# Patient Record
Sex: FEMALE | Race: WHITE | Employment: FULL TIME | URBAN - METROPOLITAN AREA
[De-identification: names, ages, dates, MRNs, and addresses within clinical notes are randomized per-mention and may not be internally consistent; named-entity substitution may affect disease eponyms.]

---

## 2019-06-13 ENCOUNTER — APPOINTMENT (OUTPATIENT)
Dept: GENERAL RADIOLOGY | Age: 39
DRG: 639 | End: 2019-06-13
Attending: EMERGENCY MEDICINE
Payer: SELF-PAY

## 2019-06-13 ENCOUNTER — HOSPITAL ENCOUNTER (INPATIENT)
Age: 39
LOS: 4 days | Discharge: HOME | DRG: 639 | End: 2019-06-17
Attending: EMERGENCY MEDICINE | Admitting: HOSPITALIST
Payer: SELF-PAY

## 2019-06-13 DIAGNOSIS — E13.10 DIABETIC KETOACIDOSIS WITHOUT COMA ASSOCIATED WITH OTHER SPECIFIED DIABETES MELLITUS (HCC): Primary | ICD-10-CM

## 2019-06-13 PROBLEM — E11.10 DKA (DIABETIC KETOACIDOSES): Status: ACTIVE | Noted: 2019-06-13

## 2019-06-13 LAB
ADMINISTERED INITIALS, ADMINIT: NORMAL
ALBUMIN SERPL-MCNC: 3.9 G/DL (ref 3.5–5)
ALBUMIN/GLOB SERPL: 1.2 {RATIO} (ref 1.1–2.2)
ALP SERPL-CCNC: 62 U/L (ref 45–117)
ALT SERPL-CCNC: 22 U/L (ref 12–78)
ANION GAP SERPL CALC-SCNC: 17 MMOL/L (ref 5–15)
ANION GAP SERPL CALC-SCNC: 7 MMOL/L (ref 5–15)
APPEARANCE UR: CLEAR
ARTERIAL PATENCY WRIST A: ABNORMAL
AST SERPL-CCNC: 16 U/L (ref 15–37)
B-OH-BUTYR SERPL-SCNC: 4.27 MMOL/L
BACTERIA URNS QL MICRO: ABNORMAL /HPF
BASE DEFICIT BLDV-SCNC: 5 MMOL/L
BASOPHILS # BLD: 0.1 K/UL (ref 0–0.1)
BASOPHILS NFR BLD: 1 % (ref 0–1)
BDY SITE: ABNORMAL
BILIRUB SERPL-MCNC: 0.6 MG/DL (ref 0.2–1)
BILIRUB UR QL: NEGATIVE
BUN SERPL-MCNC: 10 MG/DL (ref 6–20)
BUN SERPL-MCNC: 13 MG/DL (ref 6–20)
BUN/CREAT SERPL: 19 (ref 12–20)
BUN/CREAT SERPL: 20 (ref 12–20)
CALCIUM SERPL-MCNC: 8.3 MG/DL (ref 8.5–10.1)
CALCIUM SERPL-MCNC: 9.8 MG/DL (ref 8.5–10.1)
CHLORIDE SERPL-SCNC: 109 MMOL/L (ref 97–108)
CHLORIDE SERPL-SCNC: 97 MMOL/L (ref 97–108)
CO2 SERPL-SCNC: 20 MMOL/L (ref 21–32)
CO2 SERPL-SCNC: 23 MMOL/L (ref 21–32)
COLOR UR: ABNORMAL
COMMENT, HOLDF: NORMAL
CREAT SERPL-MCNC: 0.51 MG/DL (ref 0.55–1.02)
CREAT SERPL-MCNC: 0.7 MG/DL (ref 0.55–1.02)
D50 ADMINISTERED, D50ADM: 0 ML
D50 ORDER, D50ORD: 0 ML
DIFFERENTIAL METHOD BLD: ABNORMAL
EOSINOPHIL # BLD: 0.1 K/UL (ref 0–0.4)
EOSINOPHIL NFR BLD: 2 % (ref 0–7)
EPITH CASTS URNS QL MICRO: ABNORMAL /LPF
ERYTHROCYTE [DISTWIDTH] IN BLOOD BY AUTOMATED COUNT: 12.4 % (ref 11.5–14.5)
EST. AVERAGE GLUCOSE BLD GHB EST-MCNC: 355 MG/DL
GAS FLOW.O2 O2 DELIVERY SYS: ABNORMAL L/MIN
GLOBULIN SER CALC-MCNC: 3.3 G/DL (ref 2–4)
GLSCOM COMMENTS: NORMAL
GLUCOSE BLD STRIP.AUTO-MCNC: 125 MG/DL (ref 65–100)
GLUCOSE BLD STRIP.AUTO-MCNC: 140 MG/DL (ref 65–100)
GLUCOSE BLD STRIP.AUTO-MCNC: 173 MG/DL (ref 65–100)
GLUCOSE BLD STRIP.AUTO-MCNC: 179 MG/DL (ref 65–100)
GLUCOSE BLD STRIP.AUTO-MCNC: 186 MG/DL (ref 65–100)
GLUCOSE BLD STRIP.AUTO-MCNC: 194 MG/DL (ref 65–100)
GLUCOSE BLD STRIP.AUTO-MCNC: 230 MG/DL (ref 65–100)
GLUCOSE BLD STRIP.AUTO-MCNC: 275 MG/DL (ref 65–100)
GLUCOSE BLD STRIP.AUTO-MCNC: 316 MG/DL (ref 65–100)
GLUCOSE SERPL-MCNC: 166 MG/DL (ref 65–100)
GLUCOSE SERPL-MCNC: 346 MG/DL (ref 65–100)
GLUCOSE UR STRIP.AUTO-MCNC: >1000 MG/DL
GLUCOSE, GLC: 125 MG/DL
GLUCOSE, GLC: 140 MG/DL
GLUCOSE, GLC: 173 MG/DL
GLUCOSE, GLC: 179 MG/DL
GLUCOSE, GLC: 186 MG/DL
GLUCOSE, GLC: 230 MG/DL
GLUCOSE, GLC: 275 MG/DL
HBA1C MFR BLD: 14 % (ref 4.2–6.3)
HCO3 BLDV-SCNC: 21.2 MMOL/L (ref 23–28)
HCT VFR BLD AUTO: 42.1 % (ref 35–47)
HGB BLD-MCNC: 14.9 G/DL (ref 11.5–16)
HGB UR QL STRIP: ABNORMAL
HIGH TARGET, HITG: 250 MG/DL
IMM GRANULOCYTES # BLD AUTO: 0.1 K/UL (ref 0–0.04)
IMM GRANULOCYTES NFR BLD AUTO: 2 % (ref 0–0.5)
INSULIN ADMINSTERED, INSADM: 0 UNITS/HOUR
INSULIN ADMINSTERED, INSADM: 0.7 UNITS/HOUR
INSULIN ADMINSTERED, INSADM: 2.3 UNITS/HOUR
INSULIN ADMINSTERED, INSADM: 2.4 UNITS/HOUR
INSULIN ADMINSTERED, INSADM: 2.5 UNITS/HOUR
INSULIN ADMINSTERED, INSADM: 3.4 UNITS/HOUR
INSULIN ADMINSTERED, INSADM: 4.3 UNITS/HOUR
INSULIN ORDER, INSORD: 0 UNITS/HOUR
INSULIN ORDER, INSORD: 0.7 UNITS/HOUR
INSULIN ORDER, INSORD: 2.3 UNITS/HOUR
INSULIN ORDER, INSORD: 2.4 UNITS/HOUR
INSULIN ORDER, INSORD: 2.5 UNITS/HOUR
INSULIN ORDER, INSORD: 3.4 UNITS/HOUR
INSULIN ORDER, INSORD: 4.3 UNITS/HOUR
KETONES UR QL STRIP.AUTO: >80 MG/DL
LEUKOCYTE ESTERASE UR QL STRIP.AUTO: NEGATIVE
LIPASE SERPL-CCNC: 263 U/L (ref 73–393)
LOW TARGET, LOT: 150 MG/DL
LYMPHOCYTES # BLD: 1.6 K/UL (ref 0.8–3.5)
LYMPHOCYTES NFR BLD: 29 % (ref 12–49)
MAGNESIUM SERPL-MCNC: 1.4 MG/DL (ref 1.6–2.4)
MAGNESIUM SERPL-MCNC: 1.7 MG/DL (ref 1.6–2.4)
MCH RBC QN AUTO: 29.6 PG (ref 26–34)
MCHC RBC AUTO-ENTMCNC: 35.4 G/DL (ref 30–36.5)
MCV RBC AUTO: 83.5 FL (ref 80–99)
MINUTES UNTIL NEXT BG, NBG: 60 MIN
MONOCYTES # BLD: 0.4 K/UL (ref 0–1)
MONOCYTES NFR BLD: 8 % (ref 5–13)
MUCOUS THREADS URNS QL MICRO: ABNORMAL /LPF
MULTIPLIER, MUL: 0
MULTIPLIER, MUL: 0.01
MULTIPLIER, MUL: 0.02
NEUTS SEG # BLD: 3.2 K/UL (ref 1.8–8)
NEUTS SEG NFR BLD: 58 % (ref 32–75)
NITRITE UR QL STRIP.AUTO: NEGATIVE
NRBC # BLD: 0 K/UL (ref 0–0.01)
NRBC BLD-RTO: 0 PER 100 WBC
O2/TOTAL GAS SETTING VFR VENT: 21 %
ORDER INITIALS, ORDINIT: NORMAL
PCO2 BLDV: 41.3 MMHG (ref 41–51)
PH BLDV: 7.32 [PH] (ref 7.32–7.42)
PH UR STRIP: 5.5 [PH] (ref 5–8)
PHOSPHATE SERPL-MCNC: 3.8 MG/DL (ref 2.6–4.7)
PLATELET # BLD AUTO: 270 K/UL (ref 150–400)
PMV BLD AUTO: 10.8 FL (ref 8.9–12.9)
PO2 BLDV: 23 MMHG (ref 25–40)
POTASSIUM SERPL-SCNC: 3.4 MMOL/L (ref 3.5–5.1)
POTASSIUM SERPL-SCNC: 4 MMOL/L (ref 3.5–5.1)
PROT SERPL-MCNC: 7.2 G/DL (ref 6.4–8.2)
PROT UR STRIP-MCNC: NEGATIVE MG/DL
RBC # BLD AUTO: 5.04 M/UL (ref 3.8–5.2)
RBC #/AREA URNS HPF: ABNORMAL /HPF (ref 0–5)
SAMPLES BEING HELD,HOLD: NORMAL
SAO2 % BLDV: 35 % (ref 65–88)
SERVICE CMNT-IMP: ABNORMAL
SODIUM SERPL-SCNC: 134 MMOL/L (ref 136–145)
SODIUM SERPL-SCNC: 139 MMOL/L (ref 136–145)
SP GR UR REFRACTOMETRY: 1.02 (ref 1–1.03)
SPECIMEN TYPE: ABNORMAL
UROBILINOGEN UR QL STRIP.AUTO: 0.2 EU/DL (ref 0.2–1)
WBC # BLD AUTO: 5.4 K/UL (ref 3.6–11)
WBC URNS QL MICRO: ABNORMAL /HPF (ref 0–4)

## 2019-06-13 PROCEDURE — 84100 ASSAY OF PHOSPHORUS: CPT

## 2019-06-13 PROCEDURE — 81001 URINALYSIS AUTO W/SCOPE: CPT

## 2019-06-13 PROCEDURE — 74011250637 HC RX REV CODE- 250/637: Performed by: NURSE PRACTITIONER

## 2019-06-13 PROCEDURE — 96374 THER/PROPH/DIAG INJ IV PUSH: CPT

## 2019-06-13 PROCEDURE — 80053 COMPREHEN METABOLIC PANEL: CPT

## 2019-06-13 PROCEDURE — 82010 KETONE BODYS QUAN: CPT

## 2019-06-13 PROCEDURE — 36415 COLL VENOUS BLD VENIPUNCTURE: CPT

## 2019-06-13 PROCEDURE — 74011636637 HC RX REV CODE- 636/637: Performed by: EMERGENCY MEDICINE

## 2019-06-13 PROCEDURE — 96361 HYDRATE IV INFUSION ADD-ON: CPT

## 2019-06-13 PROCEDURE — 80061 LIPID PANEL: CPT

## 2019-06-13 PROCEDURE — 65660000001 HC RM ICU INTERMED STEPDOWN

## 2019-06-13 PROCEDURE — 85025 COMPLETE CBC W/AUTO DIFF WBC: CPT

## 2019-06-13 PROCEDURE — 74011000258 HC RX REV CODE- 258: Performed by: EMERGENCY MEDICINE

## 2019-06-13 PROCEDURE — 83690 ASSAY OF LIPASE: CPT

## 2019-06-13 PROCEDURE — 83036 HEMOGLOBIN GLYCOSYLATED A1C: CPT

## 2019-06-13 PROCEDURE — 74022 RADEX COMPL AQT ABD SERIES: CPT

## 2019-06-13 PROCEDURE — 74011250636 HC RX REV CODE- 250/636: Performed by: EMERGENCY MEDICINE

## 2019-06-13 PROCEDURE — 82803 BLOOD GASES ANY COMBINATION: CPT

## 2019-06-13 PROCEDURE — 83735 ASSAY OF MAGNESIUM: CPT

## 2019-06-13 PROCEDURE — 74011636637 HC RX REV CODE- 636/637: Performed by: HOSPITALIST

## 2019-06-13 PROCEDURE — 82962 GLUCOSE BLOOD TEST: CPT

## 2019-06-13 PROCEDURE — 74011250636 HC RX REV CODE- 250/636: Performed by: HOSPITALIST

## 2019-06-13 PROCEDURE — 99285 EMERGENCY DEPT VISIT HI MDM: CPT

## 2019-06-13 RX ORDER — DEXTROSE 50 % IN WATER (D50W) INTRAVENOUS SYRINGE
25-50 AS NEEDED
Status: DISCONTINUED | OUTPATIENT
Start: 2019-06-13 | End: 2019-06-13

## 2019-06-13 RX ORDER — SODIUM CHLORIDE 0.9 % (FLUSH) 0.9 %
5-40 SYRINGE (ML) INJECTION EVERY 8 HOURS
Status: DISCONTINUED | OUTPATIENT
Start: 2019-06-13 | End: 2019-06-17 | Stop reason: HOSPADM

## 2019-06-13 RX ORDER — ACETAMINOPHEN 325 MG/1
650 TABLET ORAL
Status: DISCONTINUED | OUTPATIENT
Start: 2019-06-13 | End: 2019-06-17 | Stop reason: HOSPADM

## 2019-06-13 RX ORDER — MAGNESIUM SULFATE 100 %
4 CRYSTALS MISCELLANEOUS AS NEEDED
Status: DISCONTINUED | OUTPATIENT
Start: 2019-06-13 | End: 2019-06-13

## 2019-06-13 RX ORDER — DEXTROSE MONOHYDRATE AND SODIUM CHLORIDE 5; .9 G/100ML; G/100ML
125 INJECTION, SOLUTION INTRAVENOUS CONTINUOUS
Status: DISCONTINUED | OUTPATIENT
Start: 2019-06-13 | End: 2019-06-13

## 2019-06-13 RX ORDER — MAGNESIUM SULFATE HEPTAHYDRATE 40 MG/ML
2 INJECTION, SOLUTION INTRAVENOUS ONCE
Status: COMPLETED | OUTPATIENT
Start: 2019-06-13 | End: 2019-06-13

## 2019-06-13 RX ORDER — SODIUM CHLORIDE 9 MG/ML
200 INJECTION, SOLUTION INTRAVENOUS CONTINUOUS
Status: DISCONTINUED | OUTPATIENT
Start: 2019-06-13 | End: 2019-06-13

## 2019-06-13 RX ORDER — SODIUM CHLORIDE 0.9 % (FLUSH) 0.9 %
5-40 SYRINGE (ML) INJECTION AS NEEDED
Status: DISCONTINUED | OUTPATIENT
Start: 2019-06-13 | End: 2019-06-17 | Stop reason: HOSPADM

## 2019-06-13 RX ORDER — INSULIN LISPRO 100 [IU]/ML
INJECTION, SOLUTION INTRAVENOUS; SUBCUTANEOUS
Status: DISCONTINUED | OUTPATIENT
Start: 2019-06-14 | End: 2019-06-17 | Stop reason: HOSPADM

## 2019-06-13 RX ORDER — ONDANSETRON 2 MG/ML
4 INJECTION INTRAMUSCULAR; INTRAVENOUS
Status: COMPLETED | OUTPATIENT
Start: 2019-06-13 | End: 2019-06-13

## 2019-06-13 RX ORDER — DEXTROSE 50 % IN WATER (D50W) INTRAVENOUS SYRINGE
12.5-25 AS NEEDED
Status: DISCONTINUED | OUTPATIENT
Start: 2019-06-13 | End: 2019-06-17 | Stop reason: HOSPADM

## 2019-06-13 RX ORDER — INSULIN LISPRO 100 [IU]/ML
INJECTION, SOLUTION INTRAVENOUS; SUBCUTANEOUS
Status: DISCONTINUED | OUTPATIENT
Start: 2019-06-13 | End: 2019-06-13 | Stop reason: SDUPTHER

## 2019-06-13 RX ORDER — METFORMIN HYDROCHLORIDE 1000 MG/1
1000 TABLET ORAL 2 TIMES DAILY WITH MEALS
COMMUNITY
End: 2019-12-26

## 2019-06-13 RX ORDER — MAGNESIUM SULFATE 100 %
4 CRYSTALS MISCELLANEOUS AS NEEDED
Status: DISCONTINUED | OUTPATIENT
Start: 2019-06-13 | End: 2019-06-17 | Stop reason: HOSPADM

## 2019-06-13 RX ORDER — DEXTROSE, SODIUM CHLORIDE, AND POTASSIUM CHLORIDE 5; .9; .15 G/100ML; G/100ML; G/100ML
125 INJECTION INTRAVENOUS CONTINUOUS
Status: DISCONTINUED | OUTPATIENT
Start: 2019-06-13 | End: 2019-06-13

## 2019-06-13 RX ADMIN — SODIUM CHLORIDE 200 ML/HR: 900 INJECTION, SOLUTION INTRAVENOUS at 15:19

## 2019-06-13 RX ADMIN — DEXTROSE MONOHYDRATE AND SODIUM CHLORIDE 125 ML/HR: 5; .9 INJECTION, SOLUTION INTRAVENOUS at 17:12

## 2019-06-13 RX ADMIN — SODIUM CHLORIDE 4.3 UNITS/HR: 9 INJECTION, SOLUTION INTRAVENOUS at 15:27

## 2019-06-13 RX ADMIN — Medication 10 ML: at 21:38

## 2019-06-13 RX ADMIN — HUMAN INSULIN 4 UNITS: 100 INJECTION, SUSPENSION SUBCUTANEOUS at 23:12

## 2019-06-13 RX ADMIN — MAGNESIUM SULFATE HEPTAHYDRATE 2 G: 40 INJECTION, SOLUTION INTRAVENOUS at 15:21

## 2019-06-13 RX ADMIN — ACETAMINOPHEN 650 MG: 325 TABLET ORAL at 20:53

## 2019-06-13 RX ADMIN — ONDANSETRON 4 MG: 2 INJECTION INTRAMUSCULAR; INTRAVENOUS at 13:56

## 2019-06-13 RX ADMIN — SODIUM CHLORIDE 1000 ML: 900 INJECTION, SOLUTION INTRAVENOUS at 13:56

## 2019-06-13 RX ADMIN — POTASSIUM CHLORIDE, DEXTROSE MONOHYDRATE AND SODIUM CHLORIDE 125 ML/HR: 150; 5; 900 INJECTION, SOLUTION INTRAVENOUS at 21:38

## 2019-06-13 NOTE — ROUTINE PROCESS
Bedside and Verbal shift change report given to Shaneka Esteban RN (oncoming nurse) by Guillaume Mancuso RN (offgoing nurse). Report included the following information SBAR, Kardex, Intake/Output, MAR, Recent Results and Cardiac Rhythm NSR.

## 2019-06-13 NOTE — H&P
History & Physical    Primary Care Provider: Mara Fregoso NP  Source of Information: Patient     History of Presenting Illness:   Mariya Acosta is a 41 y.o. female who presents with nausea and weakness    Pt is a 41 y.o. F with new dx of Diabetes  here with nausea, weakness and hyperglycemia. She was diagnosed 2 days ago and started on metformin 1000 mg BID. She has been compliant. Her glucose at PCP was 280 and she has a hgb A1c of 15. Patient says she was sent to ED by PCP to see Haley Mcknight she continues to be hyperglycemic despite metformin. Domo Reasoner \"  She denies complaints currently but has had intermittent N/V/D and abdominal pain for months. She has also had a 75 pound weight loss last year. She is a  but says this weight loss was unintentional.  No fever, cough,   She does have back pain. No other complaints at this time. No family history of DM      Review of Systems:  General: HPI, no fever, no changes of sleep or appetite   HEENT: no headache, no vision changes, no nose discharge, no hearing changes   RES: no wheezing, no cough, no sob  CVS: no cp, no palpitation. Muscular: no joint swelling, no muscle pain, no leg swelling  Skin: no rash, no itching   GI:HPI, no diarrhea   : no dysuria, no hematuria  Hemo: no gum bleeding, no petechial   Neuro: no sensation changes, no focal weakness   Endo:+ polydipsia, polyuria, HPI    Psych: denied depression     Past Medical History:   Diagnosis Date    Diabetes ([facility] [address]       Past Surgical History:   Procedure Laterality Date    HX ACL RECONSTRUCTION       Prior to Admission medications    Medication Sig Start Date End Date Taking? Authorizing Provider   metFORMIN (GLUCOPHAGE) 1,000 mg tablet Take 1,000 mg by mouth two (2) times daily (with meals). Yes Other, MD Jessica     No Known Allergies   History reviewed. No pertinent family history.      SOCIAL HISTORY:  Patient resides:  Independently x   Assisted Living    SNF With family care       Smoking history:   None x   Former    Chronic      Alcohol history:   None x   Social    Chronic      Ambulates:   Independently x   w/cane    w/walker    w/wc    CODE STATUS:  DNR    Full x   Other      Objective:     Physical Exam:     Visit Vitals  /78 (BP 1 Location: Left arm, BP Patient Position: At rest)   Pulse 67   Temp 98.1 °F (36.7 °C)   Resp 12   Ht 5' 4\" (1.626 m)   Wt 52.5 kg (115 lb 11.9 oz)   SpO2 97%   Breastfeeding? No   BMI 19.87 kg/m²      O2 Device: Room air    General:  Alert, cooperative, no distress, appears stated age. Head:  Normocephalic, without obvious abnormality, atraumatic. Eyes:  Conjunctivae/corneas clear. PERRL, EOMs intact. Nose: Nares normal. Septum midline. Mucosa normal. No drainage or sinus tenderness. Throat: Lips, mucosa, and tongue normal. Teeth and gums normal.   Neck: Supple, symmetrical, trachea midline, no adenopathy, thyroid: no enlargement/tenderness/nodules, no carotid bruit and no JVD. Back:   Symmetric, no curvature. ROM normal. No CVA tenderness. Lungs:   Clear to auscultation bilaterally. Chest wall:  No tenderness or deformity. Heart:  Regular rate and rhythm, S1, S2 normal, no murmur, click, rub or gallop. Abdomen:   Soft, non-tender. Bowel sounds normal. No masses,  No organomegaly. Extremities: Extremities normal, atraumatic, no cyanosis or edema. Pulses: 2+ and symmetric all extremities. Skin: Skin color, texture, turgor normal. No rashes or lesions   Neurologic: CNII-XII intact.  No focal weakness              Data Review:     Recent Days:  Recent Labs     06/13/19  1338   WBC 5.4   HGB 14.9   HCT 42.1        Recent Labs     06/13/19  1600 06/13/19  1338   NA  --  134*   K  --  4.0   CL  --  97   CO2  --  20*   GLU  --  346*   BUN  --  13   CREA  --  0.70   CA  --  9.8   MG  --  1.4*   PHOS 3.8  --    ALB  --  3.9   SGOT  --  16   ALT  --  22     No results for input(s): PH, PCO2, PO2, HCO3, FIO2 in the last 72 hours. 24 Hour Results:  Recent Results (from the past 24 hour(s))   GLUCOSE, POC    Collection Time: 06/13/19  1:33 PM   Result Value Ref Range    Glucose (POC) 316 (H) 65 - 100 mg/dL    Performed by Gabby Wilson.    Collection Time: 06/13/19  1:38 PM   Result Value Ref Range    SAMPLES BEING HELD  1 GREEN, 1 BLUE, 1 RED, 1 PURPLE     COMMENT        Add-on orders for these samples will be processed based on acceptable specimen integrity and analyte stability, which may vary by analyte. CBC WITH AUTOMATED DIFF    Collection Time: 06/13/19  1:38 PM   Result Value Ref Range    WBC 5.4 3.6 - 11.0 K/uL    RBC 5.04 3.80 - 5.20 M/uL    HGB 14.9 11.5 - 16.0 g/dL    HCT 42.1 35.0 - 47.0 %    MCV 83.5 80.0 - 99.0 FL    MCH 29.6 26.0 - 34.0 PG    MCHC 35.4 30.0 - 36.5 g/dL    RDW 12.4 11.5 - 14.5 %    PLATELET 523 860 - 899 K/uL    MPV 10.8 8.9 - 12.9 FL    NRBC 0.0 0  WBC    ABSOLUTE NRBC 0.00 0.00 - 0.01 K/uL    NEUTROPHILS 58 32 - 75 %    LYMPHOCYTES 29 12 - 49 %    MONOCYTES 8 5 - 13 %    EOSINOPHILS 2 0 - 7 %    BASOPHILS 1 0 - 1 %    IMMATURE GRANULOCYTES 2 (H) 0.0 - 0.5 %    ABS. NEUTROPHILS 3.2 1.8 - 8.0 K/UL    ABS. LYMPHOCYTES 1.6 0.8 - 3.5 K/UL    ABS. MONOCYTES 0.4 0.0 - 1.0 K/UL    ABS. EOSINOPHILS 0.1 0.0 - 0.4 K/UL    ABS. BASOPHILS 0.1 0.0 - 0.1 K/UL    ABS. IMM.  GRANS. 0.1 (H) 0.00 - 0.04 K/UL    DF AUTOMATED     METABOLIC PANEL, COMPREHENSIVE    Collection Time: 06/13/19  1:38 PM   Result Value Ref Range    Sodium 134 (L) 136 - 145 mmol/L    Potassium 4.0 3.5 - 5.1 mmol/L    Chloride 97 97 - 108 mmol/L    CO2 20 (L) 21 - 32 mmol/L    Anion gap 17 (H) 5 - 15 mmol/L    Glucose 346 (H) 65 - 100 mg/dL    BUN 13 6 - 20 MG/DL    Creatinine 0.70 0.55 - 1.02 MG/DL    BUN/Creatinine ratio 19 12 - 20      GFR est AA >60 >60 ml/min/1.73m2    GFR est non-AA >60 >60 ml/min/1.73m2    Calcium 9.8 8.5 - 10.1 MG/DL    Bilirubin, total 0.6 0.2 - 1.0 MG/DL    ALT (SGPT) 22 12 - 78 U/L    AST (SGOT) 16 15 - 37 U/L    Alk.  phosphatase 62 45 - 117 U/L    Protein, total 7.2 6.4 - 8.2 g/dL    Albumin 3.9 3.5 - 5.0 g/dL    Globulin 3.3 2.0 - 4.0 g/dL    A-G Ratio 1.2 1.1 - 2.2     LIPASE    Collection Time: 06/13/19  1:38 PM   Result Value Ref Range    Lipase 263 73 - 393 U/L   MAGNESIUM    Collection Time: 06/13/19  1:38 PM   Result Value Ref Range    Magnesium 1.4 (L) 1.6 - 2.4 mg/dL   BETA-HYDROXYBUTYRATE    Collection Time: 06/13/19  2:45 PM   Result Value Ref Range    B-hydroxybutyrate 4.27 (H) <0.40 mmol/L   POC VENOUS BLOOD GAS    Collection Time: 06/13/19  2:53 PM   Result Value Ref Range    Device: ROOM AIR      FIO2 (POC) 21 %    pH, venous (POC) 7.318 (L) 7.32 - 7.42      pCO2, venous (POC) 41.3 41 - 51 MMHG    pO2, venous (POC) 23 (L) 25 - 40 mmHg    HCO3, venous (POC) 21.2 (L) 23.0 - 28.0 MMOL/L    sO2, venous (POC) 35 (L) 65 - 88 %    Base deficit, venous (POC) 5 mmol/L    Allens test (POC) N/A      Site OTHER      Specimen type (POC) VENOUS BLOOD     URINALYSIS W/ RFLX MICROSCOPIC    Collection Time: 06/13/19  3:06 PM   Result Value Ref Range    Color YELLOW/STRAW      Appearance CLEAR CLEAR      Specific gravity 1.025 1.003 - 1.030      pH (UA) 5.5 5.0 - 8.0      Protein NEGATIVE  NEG mg/dL    Glucose >1,000 (A) NEG mg/dL    Ketone >80 (A) NEG mg/dL    Bilirubin NEGATIVE  NEG      Blood TRACE (A) NEG      Urobilinogen 0.2 0.2 - 1.0 EU/dL    Nitrites NEGATIVE  NEG      Leukocyte Esterase NEGATIVE  NEG     URINE MICROSCOPIC ONLY    Collection Time: 06/13/19  3:06 PM   Result Value Ref Range    WBC 20-50 0 - 4 /hpf    RBC 10-20 0 - 5 /hpf    Epithelial cells MANY (A) FEW /lpf    Bacteria 2+ (A) NEG /hpf    Mucus 1+ (A) NEG /lpf   GLUCOSE, POC    Collection Time: 06/13/19  3:25 PM   Result Value Ref Range    Glucose (POC) 275 (H) 65 - 100 mg/dL    Performed by Demond Vargas    Collection Time: 06/13/19  3:27 PM   Result Value Ref Range    Glucose 275 mg/dL Insulin order 4.3 units/hour    Insulin adminstered 4.3 units/hour    Multiplier 0.020     Low target 150 mg/dL    High target 250 mg/dL    D50 order 0.0 ml    D50 administered 0.00 ml    Minutes until next BG 60 min    Order initials bvm     Administered initials dc     GLSCOM Comments     PHOSPHORUS    Collection Time: 06/13/19  4:00 PM   Result Value Ref Range    Phosphorus 3.8 2.6 - 4.7 MG/DL   GLUCOSE, POC    Collection Time: 06/13/19  4:30 PM   Result Value Ref Range    Glucose (POC) 230 (H) 65 - 100 mg/dL    Performed by Patrice Santizo    Collection Time: 06/13/19  4:33 PM   Result Value Ref Range    Glucose 230 mg/dL    Insulin order 3.4 units/hour    Insulin adminstered 3.4 units/hour    Multiplier 0.020     Low target 150 mg/dL    High target 250 mg/dL    D50 order 0.0 ml    D50 administered 0.00 ml    Minutes until next BG 60 min    Order initials DTC     Administered initials DTC     GLSCOM Comments     GLUCOSE, POC    Collection Time: 06/13/19  5:36 PM   Result Value Ref Range    Glucose (POC) 186 (H) 65 - 100 mg/dL    Performed by Patrice Santizo    Collection Time: 06/13/19  5:38 PM   Result Value Ref Range    Glucose 186 mg/dL    Insulin order 2.5 units/hour    Insulin adminstered 2.5 units/hour    Multiplier 0.020     Low target 150 mg/dL    High target 250 mg/dL    D50 order 0.0 ml    D50 administered 0.00 ml    Minutes until next BG 60 min    Order initials DTC     Administered initials DTC     GLSCOM Comments     GLUCOSE, POC    Collection Time: 06/13/19  6:32 PM   Result Value Ref Range    Glucose (POC) 179 (H) 65 - 100 mg/dL    Performed by Lara Lopez    Collection Time: 06/13/19  6:34 PM   Result Value Ref Range    Glucose 179 mg/dL    Insulin order 2.4 units/hour    Insulin adminstered 2.4 units/hour    Multiplier 0.020     Low target 150 mg/dL    High target 250 mg/dL    D50 order 0.0 ml    D50 administered 0.00 ml    Minutes until next BG 60 min    Order initials SW     Administered initials SW     GLSCOM Comments           Imaging:     Assessment:     Active Problems:    DKA (diabetic ketoacidoses) ([address] (6/13/2019)           Plan:     1. DKA: mild acidemia and with mild elevated AG. Start insulin drip and IVF. Will follow BMP q4h for now. Pt does not have insurance. Consider use Novolin 70/30, which is cheaper at [address] DTC consult.    2. Hypomagnesemia: iv mag and follow        Signed By: Demario Young MD     June 13, 2019

## 2019-06-13 NOTE — ED TRIAGE NOTES
TRIAGE NOTE: Patient arrived from PCP with c/o high blood sugar. At the office it was 280 and A1C was 15.5. Diagnosed with diabetes Friday. Started on metformin Monday.

## 2019-06-13 NOTE — ED PROVIDER NOTES
HPI     Pt is a 41 y.o. F with new dx of Diabetes here with hypoglycemia. She was diagnosed this week and started on metformin 1000 mg BID. She has been compliant. Her glucose at PCP was 280 and she has a hgb A1c of 15. Patient says she was sent to ED by PCP to see Tegan Oden she continues to be hyperglycemic despite metformin. Mliss Shah Mliss Shah \"  She denies complaints currently but has had intermittent N/V/D and abdominal pain for months. She has also had a 75 pound weight loss. She is a  but says this weight loss was unintentional.  No fever, cough, urinary sx. She does have back pain. No other complaints at this time. Past Medical History:   Diagnosis Date    Diabetes [facility])        Past Surgical History:   Procedure Laterality Date    HX ACL RECONSTRUCTION           History reviewed. No pertinent family history.     Social History     Socioeconomic History    Marital status: Not on file     Spouse name: Not on file    Number of children: Not on file    Years of education: Not on file    Highest education level: Not on file   Occupational History    Not on file   Social Needs    Financial resource strain: Not on file    Food insecurity:     Worry: Not on file     Inability: Not on file    Transportation needs:     Medical: Not on file     Non-medical: Not on file   Tobacco Use    Smoking status: Never Smoker    Smokeless tobacco: Never Used   Substance and Sexual Activity    Alcohol use: Never     Frequency: Never    Drug use: Not on file    Sexual activity: Not on file   Lifestyle    Physical activity:     Days per week: Not on file     Minutes per session: Not on file    Stress: Not on file   Relationships    Social connections:     Talks on phone: Not on file     Gets together: Not on file     Attends Rastafarian service: Not on file     Active member of club or organization: Not on file     Attends meetings of clubs or organizations: Not on file     Relationship status: Not on file    Intimate partner violence:     Fear of current or ex partner: Not on file     Emotionally abused: Not on file     Physically abused: Not on file     Forced sexual activity: Not on file   Other Topics Concern    Not on file   Social History Narrative    Not on file         ALLERGIES: Patient has no known allergies. Review of Systems   Constitutional: Negative for chills and fever. Respiratory: Negative for cough and shortness of breath. Gastrointestinal: Positive for abdominal pain, diarrhea, nausea and vomiting. Musculoskeletal: Positive for back pain. Negative for myalgias. Neurological: Negative for weakness. Vitals:    06/13/19 1334 06/13/19 1400   BP: 115/83 110/76   Pulse: 76    Resp: 16    Temp: 97.8 °F (36.6 °C)    SpO2: 98% 98%   Weight: 51.8 kg (114 lb 3.2 oz)    Height: 5' 4\" (1.626 m)             Physical Exam   Constitutional: She is oriented to person, place, and time. She appears well-developed and well-nourished. No distress. HENT:   Head: Normocephalic. Mouth/Throat: Oropharynx is clear and moist.   Eyes: Pupils are equal, round, and reactive to light. Conjunctivae and EOM are normal.   Neck: Normal range of motion. Neck supple. No JVD present. Cardiovascular: Normal rate, regular rhythm, normal heart sounds and intact distal pulses. Pulmonary/Chest: Effort normal and breath sounds normal.   Abdominal: Soft. Bowel sounds are normal. She exhibits no distension. There is no tenderness. Musculoskeletal: Normal range of motion. She exhibits no edema, tenderness or deformity. Lymphadenopathy:     She has no cervical adenopathy. Neurological: She is alert and oriented to person, place, and time. No cranial nerve deficit or sensory deficit. Skin: Skin is warm and dry. Capillary refill takes less than 2 seconds. No rash noted. She is not diaphoretic. No erythema. Psychiatric: She has a normal mood and affect. Nursing note and vitals reviewed.        [facility] Procedures      2:50 PM  I have reviewed pt's labs and noted she is having mild DKA with elevated anion gap and co2 mildly low at 20 with glucose over 300. I have already given IVF bolus and will initiate insulin with hospital consult. I have discussed admission with pt. She will benefit from admission for new dx of diabetes for diabetic education as well as treatment for DKA. No known cause found at this time. However, U/A pending to look for signs of infection as a cause. Potassium is currently 4.0 thus will not replace yet. However, she will need frequent BMP to reassess. I have added on beta hydroxybutyrate and VBG now to assess DKA further. Hospitalist Kat for Admission Dr. Jensen Arreguin  2:50 PM    ED Room Number: SER07/07  Patient Name and age:  Dee Han 41 y.o.  female  Working Diagnosis:   1. Diabetic ketoacidosis without coma associated with other specified diabetes mellitus ([facility] [address]      Readmission: no  Isolation Requirements:  no  Recommended Level of Care:  telemetry  Code Status:  Full    3:21 PM  VBG returned confirming mild DKA. Ph slightly low at 7.31 and bicarb mildly low at 21.2. Will continue treatment and await transfer. 5:02 PM  Pt repeat glucose of 230. No repeat BMP as of yet. Protocol has it set for Q4 hrs. Will give PO or D5 1/2 NS for 1 hr to keep glucose above 250 and will repeat BMP to monitor potassium and anion gap as well as improvement of bicarb.     Merlin Shelter, MD

## 2019-06-13 NOTE — ROUTINE PROCESS
TRANSFER - OUT REPORT:    Verbal report given to [address] RN(name) on Mariya Acosta  being transferred to [facility]) for routine progression of care       Report consisted of patients Situation, Background, Assessment and   Recommendations(SBAR). Information from the following report(s) SBAR, Kardex, ED Summary and MAR was reviewed with the receiving nurse. Lines:   Peripheral IV 06/13/19 Left Antecubital (Active)   Site Assessment Clean, dry, & intact 6/13/2019  1:36 PM   Phlebitis Assessment 0 6/13/2019  1:36 PM   Infiltration Assessment 0 6/13/2019  1:36 PM   Dressing Status Clean, dry, & intact 6/13/2019  1:36 PM   Hub Color/Line Status Pink 6/13/2019  1:36 PM       Peripheral IV 06/13/19 Right Antecubital (Active)   Site Assessment Clean, dry, & intact 6/13/2019  2:51 PM   Phlebitis Assessment 0 6/13/2019  2:51 PM   Infiltration Assessment 0 6/13/2019  2:51 PM   Dressing Status Clean, dry, & intact 6/13/2019  2:51 PM   Dressing Type Transparent 6/13/2019  2:51 PM   Hub Color/Line Status Flushed;Patent 6/13/2019  2:51 PM        Opportunity for questions and clarification was provided.       Patient transported with:   Monitor  Tech

## 2019-06-13 NOTE — PROGRESS NOTES
Problem: DKA: Day 1  Goal: *Hemodynamically stable  Outcome: Progressing Towards Goal  Goal: *Blood glucose falling 50 to 100 mg/dl/hr  Outcome: Progressing Towards Goal  Goal: *Potassium normalizing  Outcome: Progressing Towards Goal

## 2019-06-14 LAB
ALBUMIN SERPL-MCNC: 2.8 G/DL (ref 3.5–5)
ALBUMIN/GLOB SERPL: 1.1 {RATIO} (ref 1.1–2.2)
ALP SERPL-CCNC: 47 U/L (ref 45–117)
ALT SERPL-CCNC: 17 U/L (ref 12–78)
ANION GAP SERPL CALC-SCNC: 8 MMOL/L (ref 5–15)
AST SERPL-CCNC: 14 U/L (ref 15–37)
BASOPHILS # BLD: 0.1 K/UL (ref 0–0.1)
BASOPHILS NFR BLD: 1 % (ref 0–1)
BILIRUB SERPL-MCNC: 0.4 MG/DL (ref 0.2–1)
BUN SERPL-MCNC: 11 MG/DL (ref 6–20)
BUN/CREAT SERPL: 18 (ref 12–20)
CALCIUM SERPL-MCNC: 8.2 MG/DL (ref 8.5–10.1)
CHLORIDE SERPL-SCNC: 106 MMOL/L (ref 97–108)
CHOLEST SERPL-MCNC: 148 MG/DL
CO2 SERPL-SCNC: 25 MMOL/L (ref 21–32)
COMMENT, HOLDF: NORMAL
CREAT SERPL-MCNC: 0.62 MG/DL (ref 0.55–1.02)
DIFFERENTIAL METHOD BLD: ABNORMAL
EOSINOPHIL # BLD: 0.2 K/UL (ref 0–0.4)
EOSINOPHIL NFR BLD: 3 % (ref 0–7)
ERYTHROCYTE [DISTWIDTH] IN BLOOD BY AUTOMATED COUNT: 12.2 % (ref 11.5–14.5)
GLOBULIN SER CALC-MCNC: 2.6 G/DL (ref 2–4)
GLUCOSE BLD STRIP.AUTO-MCNC: 204 MG/DL (ref 65–100)
GLUCOSE BLD STRIP.AUTO-MCNC: 227 MG/DL (ref 65–100)
GLUCOSE BLD STRIP.AUTO-MCNC: 267 MG/DL (ref 65–100)
GLUCOSE BLD STRIP.AUTO-MCNC: 267 MG/DL (ref 65–100)
GLUCOSE SERPL-MCNC: 175 MG/DL (ref 65–100)
HCT VFR BLD AUTO: 44.3 % (ref 35–47)
HDLC SERPL-MCNC: 33 MG/DL
HDLC SERPL: 4.5 {RATIO} (ref 0–5)
HGB BLD-MCNC: 15.1 G/DL (ref 11.5–16)
IMM GRANULOCYTES # BLD AUTO: 0.1 K/UL (ref 0–0.04)
IMM GRANULOCYTES NFR BLD AUTO: 2 % (ref 0–0.5)
LDLC SERPL CALC-MCNC: 61 MG/DL (ref 0–100)
LIPID PROFILE,FLP: ABNORMAL
LYMPHOCYTES # BLD: 1.5 K/UL (ref 0.8–3.5)
LYMPHOCYTES NFR BLD: 27 % (ref 12–49)
MAGNESIUM SERPL-MCNC: 1.5 MG/DL (ref 1.6–2.4)
MCH RBC QN AUTO: 29.2 PG (ref 26–34)
MCHC RBC AUTO-ENTMCNC: 34.1 G/DL (ref 30–36.5)
MCV RBC AUTO: 85.5 FL (ref 80–99)
MONOCYTES # BLD: 0.4 K/UL (ref 0–1)
MONOCYTES NFR BLD: 7 % (ref 5–13)
NEUTS SEG # BLD: 3.4 K/UL (ref 1.8–8)
NEUTS SEG NFR BLD: 60 % (ref 32–75)
NRBC # BLD: 0 K/UL (ref 0–0.01)
NRBC BLD-RTO: 0 PER 100 WBC
PHOSPHATE SERPL-MCNC: 3.1 MG/DL (ref 2.6–4.7)
PLATELET # BLD AUTO: 247 K/UL (ref 150–400)
PMV BLD AUTO: 10.4 FL (ref 8.9–12.9)
POTASSIUM SERPL-SCNC: 3.5 MMOL/L (ref 3.5–5.1)
PROT SERPL-MCNC: 5.4 G/DL (ref 6.4–8.2)
RBC # BLD AUTO: 5.18 M/UL (ref 3.8–5.2)
SAMPLES BEING HELD,HOLD: NORMAL
SERVICE CMNT-IMP: ABNORMAL
SODIUM SERPL-SCNC: 139 MMOL/L (ref 136–145)
TRIGL SERPL-MCNC: 270 MG/DL (ref ?–150)
VLDLC SERPL CALC-MCNC: 54 MG/DL
WBC # BLD AUTO: 5.7 K/UL (ref 3.6–11)

## 2019-06-14 PROCEDURE — 82962 GLUCOSE BLOOD TEST: CPT

## 2019-06-14 PROCEDURE — 74011250637 HC RX REV CODE- 250/637: Performed by: NURSE PRACTITIONER

## 2019-06-14 PROCEDURE — 36415 COLL VENOUS BLD VENIPUNCTURE: CPT

## 2019-06-14 PROCEDURE — 65270000032 HC RM SEMIPRIVATE

## 2019-06-14 PROCEDURE — 74011636637 HC RX REV CODE- 636/637: Performed by: HOSPITALIST

## 2019-06-14 PROCEDURE — 85025 COMPLETE CBC W/AUTO DIFF WBC: CPT

## 2019-06-14 PROCEDURE — 74011636637 HC RX REV CODE- 636/637: Performed by: INTERNAL MEDICINE

## 2019-06-14 PROCEDURE — 74011250637 HC RX REV CODE- 250/637: Performed by: INTERNAL MEDICINE

## 2019-06-14 PROCEDURE — 87086 URINE CULTURE/COLONY COUNT: CPT

## 2019-06-14 RX ORDER — POTASSIUM CHLORIDE 750 MG/1
20 TABLET, FILM COATED, EXTENDED RELEASE ORAL
Status: COMPLETED | OUTPATIENT
Start: 2019-06-14 | End: 2019-06-14

## 2019-06-14 RX ORDER — INSULIN LISPRO 100 [IU]/ML
3 INJECTION, SOLUTION INTRAVENOUS; SUBCUTANEOUS
Status: DISCONTINUED | OUTPATIENT
Start: 2019-06-14 | End: 2019-06-16

## 2019-06-14 RX ORDER — INSULIN GLARGINE 100 [IU]/ML
10 INJECTION, SOLUTION SUBCUTANEOUS DAILY
Status: DISCONTINUED | OUTPATIENT
Start: 2019-06-14 | End: 2019-06-14

## 2019-06-14 RX ADMIN — Medication 10 ML: at 16:44

## 2019-06-14 RX ADMIN — INSULIN LISPRO 5 UNITS: 100 INJECTION, SOLUTION INTRAVENOUS; SUBCUTANEOUS at 12:13

## 2019-06-14 RX ADMIN — POTASSIUM CHLORIDE 20 MEQ: 750 TABLET, FILM COATED, EXTENDED RELEASE ORAL at 10:29

## 2019-06-14 RX ADMIN — ACETAMINOPHEN 650 MG: 325 TABLET ORAL at 18:25

## 2019-06-14 RX ADMIN — INSULIN LISPRO 3 UNITS: 100 INJECTION, SOLUTION INTRAVENOUS; SUBCUTANEOUS at 06:19

## 2019-06-14 RX ADMIN — ACETAMINOPHEN 650 MG: 325 TABLET ORAL at 06:18

## 2019-06-14 RX ADMIN — INSULIN GLARGINE 10 UNITS: 100 INJECTION, SOLUTION SUBCUTANEOUS at 12:13

## 2019-06-14 RX ADMIN — INSULIN LISPRO 6 UNITS: 100 INJECTION, SOLUTION INTRAVENOUS; SUBCUTANEOUS at 16:40

## 2019-06-14 RX ADMIN — INSULIN LISPRO 3 UNITS: 100 INJECTION, SOLUTION INTRAVENOUS; SUBCUTANEOUS at 22:28

## 2019-06-14 RX ADMIN — HUMAN INSULIN 10 UNITS: 100 INJECTION, SUSPENSION SUBCUTANEOUS at 06:19

## 2019-06-14 NOTE — PROGRESS NOTES
Follow up patient. Repeat lab at 7pm, DKA resolved. Her DKA is very mild. Will  dc insulin drip now . Will give patient snack and small dose nph bedtime. Will start NPH 10units bid in am. Eventually will change to novolin 70/30 on discharge.

## 2019-06-14 NOTE — PROGRESS NOTES
Problem: Falls - Risk of  Goal: *Absence of Falls  Description  Document Chris Galvin Fall Risk and appropriate interventions in the flowsheet. Outcome: Progressing Towards Goal     Pt remains free of falls during admission. Call bell and frequently used items within reach. Bedside table within reach. Patient provided non skid socks and instructed to call out for nurse when in need of assistance. Problem: Patient Education: Go to Patient Education Activity  Goal: Patient/Family Education  Outcome: Progressing Towards Goal    Pt educated on fall prevention. Pt demonstrates appropriate understanding. Pt is oriented and calls out appropriately for assistance ambulating. Purposeful hourly rounds initiated by staff. Problem: DKA: Day 1  Goal: Treatments/Interventions/Procedures  Outcome: Progressing Towards Goal   Discussed disease process with patient. We discussed insulin administration and different types of insulin. She was given diabetes handbook and informed of DTC coming to see her. Dr. Ignacio Min called and stated that she wanted insulin drip discontinued. Protocol was discussed and insulin drip was stopped per doctors order.

## 2019-06-14 NOTE — PROGRESS NOTES
Hospitalist Progress Note                               Rebecca Silveira MD                                     Answering service: [phone]                               OR [phone] from in house phone                                         Date of Service:  2019  NAME:  Mariya Acosta  :  1980  MRN:  [id number]      Admission Summary:     40 Y/O female with recently diagnosed Diabetes mellitus with initiation of Metformin 1000mg twice daily, was referred to the ER by her PCP for persistent Hyperglycemia with an A1C of 14. Patient C/O intermittent nausea, generalized weakness and an approximately 75-pound weight loss in the past year. Reason for follow up:      CC: Nausea, generalized weakness, weight loss and Hyperglycemia. Patient appeared comfortable lying in bed on am rounds. Denied any worsening of the presenting symptoms. Assessment & Plan:     1) Endocrine: Resolved Diabetic Ketoacidosis. Recent newly diagnosed uncontrolled  Diabetes with complications including hyperglycemia. A1C 14 Accucheck monitoring, Basal and sliding scale insulin, diabetic teaching. Diabetes Management Team consult. 2) Electrolytes: Resolved probable mild Psuedohyponatremia due to severe Hyperglycemia present on admission. Resolved Hypomagnesemia. Mild Hypokalemia. Repletion with F/U.    3) ID: Asymptomatic UTI without any stigmata of sepsis. Afebrile, urine culture. Monitor off antibiotics. 4) Prophylaxis: DVT. 5) Directives: Full Code. 6) plan: Anticipate D/C to home in 1-2 days. D/W patient. Hospital Problems  Never Reviewed          Codes Class Noted POA    [facility] ICD-10-CM: E13.10  ICD-9-CM: 250.10  2019 Unknown            Physical Examination:      Last 24hrs VS reviewed since prior progress note.  Most recent are:  Visit Vitals  /79 (BP 1 Location: Left arm, BP Patient Position: At rest) Pulse 66   Temp 97.7 °F (36.5 °C)   Resp 16   Ht 5' 4\" (1.626 m)   Wt 52.5 kg (115 lb 11.9 oz)   SpO2 100%   Breastfeeding? No   BMI 19.87 kg/m²           Constitutional:  No acute distress, cooperative, pleasant    HEENT: Head is a traumatic,  Un icteric sclera. Pink conjunctiva,no erythema or discharge. Oral mucous moist, oropharynx benign. Neck supple,    Resp:  CTA bilaterally. No wheezing/rhonchi/rales. No accessory muscle use   CV:  Regular rhythm, normal rate, no murmurs, gallops, rubs    GI:  Soft, non distended, non tender. normoactive bowel sounds, no hepatosplenomegaly    :  No CVA or suprapubic tenderness   Skin  :  No erythema,rash,bullae,dipigmentation     Musculoskeletal:  No edema, warm, 2+ pulses throughout    Neurologic:  AAOx3, CN II-XII reviewed. Moves all extremities. Intake/Output Summary (Last 24 hours) at 6/14/2019 0933  Last data filed at 6/13/2019 2000  Gross per 24 hour   Intake 189.2 ml   Output --   Net 189.2 ml              Labs:     Recent Labs     06/13/19  1338   WBC 5.4   HGB 14.9   HCT 42.1        Recent Labs     06/13/19  1908 06/13/19  1600 06/13/19  1338 06/13/19  1045     --  134* 139   K 3.4*  --  4.0 3.5   *  --  97 106   CO2 23  --  20* 25   BUN 10  --  13 11   CREA 0.51*  --  0.70 0.62   *  --  346* 175*   CA 8.3*  --  9.8 8.2*   MG 1.7  --  1.4* 1.5*   PHOS  --  3.8  --  3.1     Recent Labs     06/13/19  1338 06/13/19  1045   SGOT 16 14*   ALT 22 17   AP 62 47   TBILI 0.6 0.4   TP 7.2 5.4*   ALB 3.9 2.8*   GLOB 3.3 2.6   LPSE 263  --      No results for input(s): INR, PTP, APTT in the last 72 hours. No lab exists for component: INREXT   No results for input(s): FE, TIBC, PSAT, FERR in the last 72 hours. No results found for: FOL, RBCF   No results for input(s): PH, PCO2, PO2 in the last 72 hours. No results for input(s): CPK, CKNDX, TROIQ in the last 72 hours.     No lab exists for component: CPKMB  Lab Results   Component Value Date/Time    Cholesterol, total 148 06/13/2019 10:45 AM    HDL Cholesterol 33 06/13/2019 10:45 AM    LDL, calculated 61 06/13/2019 10:45 AM    Triglyceride 270 (H) 06/13/2019 10:45 AM    CHOL/HDL Ratio 4.5 06/13/2019 10:45 AM     Lab Results   Component Value Date/Time    Glucose (POC) 204 (H) 06/14/2019 06:07 AM    Glucose (POC) 194 (H) 06/13/2019 11:01 PM    Glucose (POC) 140 (H) 06/13/2019 09:50 PM    Glucose (POC) 125 (H) 06/13/2019 08:43 PM    Glucose (POC) 173 (H) 06/13/2019 07:39 PM     Lab Results   Component Value Date/Time    Color YELLOW/STRAW 06/13/2019 03:06 PM    Appearance CLEAR 06/13/2019 03:06 PM    Specific gravity 1.025 06/13/2019 03:06 PM    pH (UA) 5.5 06/13/2019 03:06 PM    Protein NEGATIVE  06/13/2019 03:06 PM    Glucose >1,000 (A) 06/13/2019 03:06 PM    Ketone >80 (A) 06/13/2019 03:06 PM    Bilirubin NEGATIVE  06/13/2019 03:06 PM    Urobilinogen 0.2 06/13/2019 03:06 PM    Nitrites NEGATIVE  06/13/2019 03:06 PM    Leukocyte Esterase NEGATIVE  06/13/2019 03:06 PM    Epithelial cells MANY (A) 06/13/2019 03:06 PM    Bacteria 2+ (A) 06/13/2019 03:06 PM    WBC 20-50 06/13/2019 03:06 PM    RBC 10-20 06/13/2019 03:06 PM         Medications Reviewed:     Current Facility-Administered Medications   Medication Dose Route Frequency    sodium chloride (NS) flush 5-40 mL  5-40 mL IntraVENous Q8H    sodium chloride (NS) flush 5-40 mL  5-40 mL IntraVENous PRN    glucagon (GLUCAGEN) injection 1 mg  1 mg IntraMUSCular PRN    acetaminophen (TYLENOL) tablet 650 mg  650 mg Oral Q6H PRN    insulin NPH (NOVOLIN N, HUMULIN N) injection 10 Units  10 Units SubCUTAneous ACB&D    insulin lispro (HUMALOG) injection   SubCUTAneous AC&HS    glucose chewable tablet 16 g  4 Tab Oral PRN    dextrose (D50W) injection syrg 12.5-25 g  12.5-25 g IntraVENous PRN     ______________________________________________________________________  EXPECTED LENGTH OF STAY: 2d 2h  ACTUAL LENGTH OF STAY:          1 Candace Burks MD

## 2019-06-14 NOTE — PROGRESS NOTES
Care Management Interventions  PCP Verified by CM: Yes(NP Renetta Urbina)  Last Visit to PCP: 06/12/19  Palliative Care Criteria Met (RRAT>21 & CHF Dx)?: No  Mode of Transport at Discharge: Other (see comment)(Partner)  Physical Therapy Consult: No  Occupational Therapy Consult: No  Speech Therapy Consult: No  Current Support Network: Other(Lives w Partner)  Confirm Follow Up Transport: Family  Plan discussed with Pt/Family/Caregiver: Yes  Discharge Location  Discharge Placement: Home     Cm met with patient briefly. Patient lives w her partner and is I in all ADL's and activities. Met with Diabetic Educator w DTC today. MedAssist met with patient- she wants to think about Medicaid application. Was provided with Care Card information. No cm needs identified unless patient needs financial assistance for medications. Reason for Admission:   DKA                   RRAT Score:    9                 Plan for utilizing home health:      None at this time                    Current Advanced Directive/Advance Care Plan: Not on file                         Transition of Care Plan:   Return home partner    Cm will continue to follow while here.

## 2019-06-14 NOTE — DIABETES MGMT
[facility]    [facility] Consult Note    Recommendations/ Comments: On 6-  mg/dL, gap 18. On gtt initially. Received NPH 10 units at 0619 today and then Lantus 10 units at 1213 today  BG's 204 mg/dL - 267 mg/dL today    Pt is without insurance. Suggest Novolin 70/30 ([employer]i On) insulin and 1/2cc syringes on discharge    To prepare for this    If appropriate, please consider  d/c Lantus  Add NPH 8 units BID breakfast and supper starting tomorrow and           lispro 3 units AC TID    This would convert to Novolin 70/30 insulin 12 units at breafkast and 12 units at supper    Dr Terence Rushing regarding above    Current hospital DM medication:   Lantus 10 units each AM and  Lispro normal sensitivity correction scale    Consult received for:  [x]             Assessment of home management                [x]      Medication Recommendations                [x]             Meter/monitoring     [x]             Insulin instruction     [x]             New diagnosis     []             Outpatient education     []             Insulin pump patient     []             Insulin infusion     [x]             DKA/HHS    Chart reviewed and initial evaluation complete on [facility]. Patient is a 41 y.o. female with new dx one week ago. She began Metformin on 6/10/2019  About a year ago she began an intentional weight loss program and got to her goal. Then about 6-8 weeks ago she began losing weight unintentionally  She works as a   Her partner, Jannet Rizo was present for this education    Assessed and instructed patient on the following:   · interpretation of lab results, A1c 14%  · blood sugar goals,   · complications of diabetes mellitus,   · hypoglycemia prevention and treatment,   · Exercise, balancing it with regular emals and snacks and peak of insulin acition  · insulin action and duration NPH/ Regular compared to Lantus and Humalog.  Availability of [employer] insulin explained  · DKA, causes, symptoms, prevention,   · SMBG skills, Instructed in use of One Touch Verio to get he started. Availability of [facility] [place] meter and test strips explained - she will purchase this  · nutrition - she drinks only water, does not eat sweet foods. She eats regular meals and snacks and includes protein   · referred to Diabetes Educator - she will consider a 1:1 appt    Encouraged the following:   · Continue healthy lifestyle with meals and exercise  · Test BG 2x/day  · Report results < 80 or > 200 persistently to PCP  · Take medicaiton as prescribed  · F/U with PCP Dr Rian Ureña    Provided patient with the following: [x]             Diabetes Self Care Guide               [x]             Insulin education materials               []             CHO counting education materials               [x]             Outpatient DTC contact number               [x]             Glucometer               Patient was able to give return demonstration of    [x]       Glucometer    [x]       saline injection      with []     without [x]       assistance needed. [x]       Nurse to have patient self inject prior to discharge. Discussed with patient and/or family need for follow up appointment for diabetes management after discharge. A1c:   Lab Results   Component Value Date/Time    Hemoglobin A1c 14.0 (H) 06/13/2019 04:00 PM       Recent Glucose Results:   Lab Results   Component Value Date/Time     (H) 06/13/2019 07:08 PM    GLUCPOC 267 (H) 06/14/2019 11:58 AM    GLUCPOC 204 (H) 06/14/2019 06:07 AM    GLUCPOC 194 (H) 06/13/2019 11:01 PM        Lab Results   Component Value Date/Time    Creatinine 0.51 (L) 06/13/2019 07:08 PM     Estimated Creatinine Clearance: 126.4 mL/min (A) (based on SCr of 0.51 mg/dL (L)). Active Orders   Diet    DIET DIABETIC CONSISTENT CARB Regular        PO intake: No data found. Will continue to follow as needed. Thank you.   Cintia Pollard RN, CDE  Pager [phone]    Time spent: 110 min

## 2019-06-14 NOTE — ROUTINE PROCESS
Bedside shift change report given to David (oncoming nurse) by Ivory Rosas (offgoing nurse). Report included the following information SBAR, Kardex, MAR and Recent Results.

## 2019-06-14 NOTE — PROGRESS NOTES
NUTRITION education brief     Diet: consistent CHO    Pt visited today for MST. Pt reporting wt loss of 75# over past 1-2 years. She reports wt loss started off as intentionally but over past month has had unintentional wt loss. Muscle and fat wasting observed. Wt Readings from Last 10 Encounters:   06/13/19 52.5 kg (115 lb 11.9 oz)     New dc of DM noted with A1c of 14. Predict recent wt loss related to poor BG control. Pt works as  so has some baseline knowledge of diet principles. Briefly discussed smaller more frequent meal with protein for BG control but DTC visiting so discussion kept short. Glucerna added BID for now (440kcal, 20g protein) as snacks. Appropriate DM snacks also at bedisde brought in by friends. Pt likely to d/c home over the weekend once BG controlled. Recommend pt to follow-up outpatient with diabetes treatment center for more in-depth diet education in more conducive environment. Basic diet education included in DTC handouts. If pt remains through the weekend will revisit for additional diet education while inpatient.      Austen Hatch, RD [address] , Pager #[phone] or [phone]

## 2019-06-14 NOTE — PROGRESS NOTES
Problem: Falls - Risk of  Goal: *Absence of Falls  Description  Document Adrian Allen Fall Risk and appropriate interventions in the flowsheet.   Outcome: Progressing Towards Goal     Problem: Patient Education: Go to Patient Education Activity  Goal: Patient/Family Education  Outcome: Progressing Towards Goal

## 2019-06-15 LAB
ALBUMIN SERPL-MCNC: 3.1 G/DL (ref 3.5–5)
ALBUMIN/GLOB SERPL: 1.1 {RATIO} (ref 1.1–2.2)
ALP SERPL-CCNC: 51 U/L (ref 45–117)
ALT SERPL-CCNC: 18 U/L (ref 12–78)
ANION GAP SERPL CALC-SCNC: 5 MMOL/L (ref 5–15)
AST SERPL-CCNC: 19 U/L (ref 15–37)
BILIRUB SERPL-MCNC: 0.4 MG/DL (ref 0.2–1)
BUN SERPL-MCNC: 17 MG/DL (ref 6–20)
BUN/CREAT SERPL: 35 (ref 12–20)
CALCIUM SERPL-MCNC: 8.8 MG/DL (ref 8.5–10.1)
CHLORIDE SERPL-SCNC: 104 MMOL/L (ref 97–108)
CO2 SERPL-SCNC: 28 MMOL/L (ref 21–32)
CREAT SERPL-MCNC: 0.48 MG/DL (ref 0.55–1.02)
GLOBULIN SER CALC-MCNC: 2.8 G/DL (ref 2–4)
GLUCOSE BLD STRIP.AUTO-MCNC: 167 MG/DL (ref 65–100)
GLUCOSE BLD STRIP.AUTO-MCNC: 225 MG/DL (ref 65–100)
GLUCOSE BLD STRIP.AUTO-MCNC: 283 MG/DL (ref 65–100)
GLUCOSE BLD STRIP.AUTO-MCNC: 299 MG/DL (ref 65–100)
GLUCOSE SERPL-MCNC: 250 MG/DL (ref 65–100)
POTASSIUM SERPL-SCNC: 3.9 MMOL/L (ref 3.5–5.1)
PROT SERPL-MCNC: 5.9 G/DL (ref 6.4–8.2)
SERVICE CMNT-IMP: ABNORMAL
SODIUM SERPL-SCNC: 137 MMOL/L (ref 136–145)

## 2019-06-15 PROCEDURE — 74011636637 HC RX REV CODE- 636/637: Performed by: INTERNAL MEDICINE

## 2019-06-15 PROCEDURE — 74011250637 HC RX REV CODE- 250/637: Performed by: NURSE PRACTITIONER

## 2019-06-15 PROCEDURE — 65270000032 HC RM SEMIPRIVATE

## 2019-06-15 PROCEDURE — 82962 GLUCOSE BLOOD TEST: CPT

## 2019-06-15 PROCEDURE — 36415 COLL VENOUS BLD VENIPUNCTURE: CPT

## 2019-06-15 PROCEDURE — 74011636637 HC RX REV CODE- 636/637: Performed by: HOSPITALIST

## 2019-06-15 PROCEDURE — 80053 COMPREHEN METABOLIC PANEL: CPT

## 2019-06-15 RX ADMIN — INSULIN LISPRO 3 UNITS: 100 INJECTION, SOLUTION INTRAVENOUS; SUBCUTANEOUS at 16:30

## 2019-06-15 RX ADMIN — Medication 10 ML: at 16:31

## 2019-06-15 RX ADMIN — ACETAMINOPHEN 650 MG: 325 TABLET ORAL at 06:21

## 2019-06-15 RX ADMIN — INSULIN LISPRO 5 UNITS: 100 INJECTION, SOLUTION INTRAVENOUS; SUBCUTANEOUS at 12:02

## 2019-06-15 RX ADMIN — INSULIN LISPRO 3 UNITS: 100 INJECTION, SOLUTION INTRAVENOUS; SUBCUTANEOUS at 08:25

## 2019-06-15 RX ADMIN — INSULIN LISPRO 3 UNITS: 100 INJECTION, SOLUTION INTRAVENOUS; SUBCUTANEOUS at 12:03

## 2019-06-15 RX ADMIN — INSULIN LISPRO 5 UNITS: 100 INJECTION, SOLUTION INTRAVENOUS; SUBCUTANEOUS at 08:24

## 2019-06-15 RX ADMIN — HUMAN INSULIN 8 UNITS: 100 INJECTION, SUSPENSION SUBCUTANEOUS at 08:24

## 2019-06-15 RX ADMIN — HUMAN INSULIN 10 UNITS: 100 INJECTION, SUSPENSION SUBCUTANEOUS at 16:30

## 2019-06-15 NOTE — PROGRESS NOTES
Hospitalist Progress Note                               Onel Licona MD                                     Answering service: [phone]                               OR [phone] from in house phone                                         Date of Service:  6/15/2019  NAME:  Juleen Cheadle  :  1980  MRN:  [id number]      Admission Summary:     40 Y/O female with recently diagnosed Diabetes mellitus with initiation of Metformin 1000mg twice daily, was referred to the ER by her PCP for persistent Hyperglycemia with an A1C of 14. Patient C/O intermittent nausea, generalized weakness and an approximately 75-pound weight loss in the past year. Reason for follow up:      CC: Nausea, generalized weakness, weight loss and Hyperglycemia. Patient appeared comfortable sitting up in bed on am rounds. Assessment & Plan:     1) Endocrine: Resolved Diabetic Ketoacidosis. Recent newly diagnosed uncontrolled  Diabetes with complications including hyperglycemia. A1C 14 Accucheck monitoring, increase NPH Insulin to 10 units twice a day, continue Insulin Lispro 3 units three times a day with meals, sliding scale insulin, diabetic teaching. Diabetes Management Team recommendations noted and appreciated. 2) Electrolytes: Resolved probable mild Psuedohyponatremia due to severe Hyperglycemia present on admission. Resolved Hypomagnesemia and Hypokalemia. 3) ID: Asymptomatic UTI without any stigmata of sepsis. Afebrile, urine culture. Monitor off antibiotics. 4) Prophylaxis: DVT. 5) Directives: Full Code. 6) plan: Anticipate D/C to home in 1-2 days if blood sugar control improved. D/W patient. Hospital Problems  Never Reviewed          Codes Class Noted POA    [facility] ICD-10-CM: E13.10  ICD-9-CM: 250.10  2019 Unknown            Physical Examination:      Last 24hrs VS reviewed since prior progress note.  Most recent are:  Visit Vitals  /64 (BP 1 Location: Right arm, BP Patient Position: At rest)   Pulse 76   Temp 97.9 °F (36.6 °C)   Resp 16   Ht 5' 4\" (1.626 m)   Wt 52.3 kg (115 lb 4.8 oz)   SpO2 98%   Breastfeeding? No   BMI 19.79 kg/m²           Constitutional:  No acute distress, cooperative, pleasant    HEENT: Head is a traumatic,  Un icteric sclera. Pink conjunctiva,no erythema or discharge. Oral mucous moist, oropharynx benign. Neck supple,    Resp:  CTA bilaterally. No wheezing/rhonchi/rales. No accessory muscle use   CV:  Regular rhythm, normal rate, no murmurs, gallops, rubs    GI:  Soft, non distended, non tender. normoactive bowel sounds, no hepatosplenomegaly    :  No CVA or suprapubic tenderness   Skin  :  No erythema,rash,bullae,dipigmentation     Musculoskeletal:  No edema, warm, 2+ pulses throughout    Neurologic:  AAOx3, CN II-XII reviewed. Moves all extremities. No intake or output data in the 24 hours ending 06/15/19 1010           Labs:     Recent Labs     06/14/19  1038 06/13/19  1338   WBC 5.7 5.4   HGB 15.1 14.9   HCT 44.3 42.1    270     Recent Labs     06/15/19  0615 06/13/19  1908 06/13/19  1600 06/13/19  1338 06/13/19  1045    139  --  134* 139   K 3.9 3.4*  --  4.0 3.5    109*  --  97 106   CO2 28 23  --  20* 25   BUN 17 10  --  13 11   CREA 0.48* 0.51*  --  0.70 0.62   * 166*  --  346* 175*   CA 8.8 8.3*  --  9.8 8.2*   MG  --  1.7  --  1.4* 1.5*   PHOS  --   --  3.8  --  3.1     Recent Labs     06/15/19  0615 06/13/19  1338 06/13/19  1045   SGOT 19 16 14*   ALT 18 22 17   AP 51 62 47   TBILI 0.4 0.6 0.4   TP 5.9* 7.2 5.4*   ALB 3.1* 3.9 2.8*   GLOB 2.8 3.3 2.6   LPSE  --  263  --      No results for input(s): INR, PTP, APTT in the last 72 hours. No lab exists for component: INREXT, INREXT   No results for input(s): FE, TIBC, PSAT, FERR in the last 72 hours.    No results found for: FOL, RBCF   No results for input(s): PH, PCO2, PO2 in the last 72 hours. No results for input(s): CPK, CKNDX, TROIQ in the last 72 hours.     No lab exists for component: CPKMB  Lab Results   Component Value Date/Time    Cholesterol, total 148 06/13/2019 10:45 AM    HDL Cholesterol 33 06/13/2019 10:45 AM    LDL, calculated 61 06/13/2019 10:45 AM    Triglyceride 270 (H) 06/13/2019 10:45 AM    CHOL/HDL Ratio 4.5 06/13/2019 10:45 AM     Lab Results   Component Value Date/Time    Glucose (POC) 283 (H) 06/15/2019 07:08 AM    Glucose (POC) 267 (H) 06/14/2019 10:08 PM    Glucose (POC) 227 (H) 06/14/2019 04:05 PM    Glucose (POC) 267 (H) 06/14/2019 11:58 AM    Glucose (POC) 204 (H) 06/14/2019 06:07 AM     Lab Results   Component Value Date/Time    Color YELLOW/STRAW 06/13/2019 03:06 PM    Appearance CLEAR 06/13/2019 03:06 PM    Specific gravity 1.025 06/13/2019 03:06 PM    pH (UA) 5.5 06/13/2019 03:06 PM    Protein NEGATIVE  06/13/2019 03:06 PM    Glucose >1,000 (A) 06/13/2019 03:06 PM    Ketone >80 (A) 06/13/2019 03:06 PM    Bilirubin NEGATIVE  06/13/2019 03:06 PM    Urobilinogen 0.2 06/13/2019 03:06 PM    Nitrites NEGATIVE  06/13/2019 03:06 PM    Leukocyte Esterase NEGATIVE  06/13/2019 03:06 PM    Epithelial cells MANY (A) 06/13/2019 03:06 PM    Bacteria 2+ (A) 06/13/2019 03:06 PM    WBC 20-50 06/13/2019 03:06 PM    RBC 10-20 06/13/2019 03:06 PM         Medications Reviewed:     Current Facility-Administered Medications   Medication Dose Route Frequency    insulin NPH (NOVOLIN N, HUMULIN N) injection 8 Units  8 Units SubCUTAneous ACB&D    insulin lispro (HUMALOG) injection 3 Units  3 Units SubCUTAneous TIDAC    sodium chloride (NS) flush 5-40 mL  5-40 mL IntraVENous Q8H    sodium chloride (NS) flush 5-40 mL  5-40 mL IntraVENous PRN    glucagon (GLUCAGEN) injection 1 mg  1 mg IntraMUSCular PRN    acetaminophen (TYLENOL) tablet 650 mg  650 mg Oral Q6H PRN    insulin lispro (HUMALOG) injection   SubCUTAneous AC&HS    glucose chewable tablet 16 g  4 Tab Oral PRN    dextrose (D50W) injection syrg 12.5-25 g  12.5-25 g IntraVENous PRN     ______________________________________________________________________  EXPECTED LENGTH OF STAY: 2d 2h  ACTUAL LENGTH OF STAY:          2                 Beny Sanford MD

## 2019-06-15 NOTE — PROGRESS NOTES
Bedside shift change report given to Soheila Lemus (oncoming nurse) by Mary Castellon RN (offgoing nurse). Report included the following information SBAR and Kardex.

## 2019-06-15 NOTE — PROGRESS NOTES
Problem: Falls - Risk of  Goal: *Absence of Falls  Description  Document Maris Marquez Fall Risk and appropriate interventions in the flowsheet.   Outcome: Progressing Towards Goal     Problem: Patient Education: Go to Patient Education Activity  Goal: Patient/Family Education  Outcome: Progressing Towards Goal     Problem: Patient Education: Go to Patient Education Activity  Goal: Patient/Family Education  Outcome: Progressing Towards Goal

## 2019-06-15 NOTE — PROGRESS NOTES
Problem: Falls - Risk of  Goal: *Absence of Falls  Description  Document Florian Mejia Fall Risk and appropriate interventions in the flowsheet.   Outcome: Progressing Towards Goal

## 2019-06-15 NOTE — PROGRESS NOTES
Bedside and Verbal shift change report given to [address] [place] [address], [address], RN (oncoming nurse) by Marleni Sanchez RN (offgoing nurse). Report included the following information SBAR, Kardex, Intake/Output, MAR and Recent Results.

## 2019-06-16 LAB
ANION GAP SERPL CALC-SCNC: 6 MMOL/L (ref 5–15)
BACTERIA SPEC CULT: NORMAL
BUN SERPL-MCNC: 22 MG/DL (ref 6–20)
BUN/CREAT SERPL: 37 (ref 12–20)
CALCIUM SERPL-MCNC: 8.7 MG/DL (ref 8.5–10.1)
CC UR VC: NORMAL
CHLORIDE SERPL-SCNC: 103 MMOL/L (ref 97–108)
CO2 SERPL-SCNC: 28 MMOL/L (ref 21–32)
CREAT SERPL-MCNC: 0.59 MG/DL (ref 0.55–1.02)
GLUCOSE BLD STRIP.AUTO-MCNC: 126 MG/DL (ref 65–100)
GLUCOSE BLD STRIP.AUTO-MCNC: 183 MG/DL (ref 65–100)
GLUCOSE BLD STRIP.AUTO-MCNC: 300 MG/DL (ref 65–100)
GLUCOSE BLD STRIP.AUTO-MCNC: 320 MG/DL (ref 65–100)
GLUCOSE BLD STRIP.AUTO-MCNC: 326 MG/DL (ref 65–100)
GLUCOSE BLD STRIP.AUTO-MCNC: 84 MG/DL (ref 65–100)
GLUCOSE SERPL-MCNC: 329 MG/DL (ref 65–100)
POTASSIUM SERPL-SCNC: 4.2 MMOL/L (ref 3.5–5.1)
SERVICE CMNT-IMP: ABNORMAL
SERVICE CMNT-IMP: NORMAL
SERVICE CMNT-IMP: NORMAL
SODIUM SERPL-SCNC: 137 MMOL/L (ref 136–145)

## 2019-06-16 PROCEDURE — 65270000029 HC RM PRIVATE

## 2019-06-16 PROCEDURE — 80048 BASIC METABOLIC PNL TOTAL CA: CPT

## 2019-06-16 PROCEDURE — 74011636637 HC RX REV CODE- 636/637: Performed by: HOSPITALIST

## 2019-06-16 PROCEDURE — 74011636637 HC RX REV CODE- 636/637: Performed by: INTERNAL MEDICINE

## 2019-06-16 PROCEDURE — 36415 COLL VENOUS BLD VENIPUNCTURE: CPT

## 2019-06-16 PROCEDURE — 82962 GLUCOSE BLOOD TEST: CPT

## 2019-06-16 RX ORDER — INSULIN LISPRO 100 [IU]/ML
3 INJECTION, SOLUTION INTRAVENOUS; SUBCUTANEOUS
Status: DISCONTINUED | OUTPATIENT
Start: 2019-06-17 | End: 2019-06-17 | Stop reason: HOSPADM

## 2019-06-16 RX ORDER — INSULIN LISPRO 100 [IU]/ML
6 INJECTION, SOLUTION INTRAVENOUS; SUBCUTANEOUS
Status: DISCONTINUED | OUTPATIENT
Start: 2019-06-16 | End: 2019-06-16

## 2019-06-16 RX ADMIN — INSULIN LISPRO 7 UNITS: 100 INJECTION, SOLUTION INTRAVENOUS; SUBCUTANEOUS at 08:40

## 2019-06-16 RX ADMIN — INSULIN LISPRO 3 UNITS: 100 INJECTION, SOLUTION INTRAVENOUS; SUBCUTANEOUS at 11:48

## 2019-06-16 RX ADMIN — Medication 10 ML: at 22:07

## 2019-06-16 RX ADMIN — INSULIN LISPRO 3 UNITS: 100 INJECTION, SOLUTION INTRAVENOUS; SUBCUTANEOUS at 08:39

## 2019-06-16 RX ADMIN — HUMAN INSULIN 15 UNITS: 100 INJECTION, SUSPENSION SUBCUTANEOUS at 08:40

## 2019-06-16 RX ADMIN — HUMAN INSULIN 20 UNITS: 100 INJECTION, SUSPENSION SUBCUTANEOUS at 16:48

## 2019-06-16 RX ADMIN — Medication 10 ML: at 16:48

## 2019-06-16 RX ADMIN — INSULIN LISPRO 7 UNITS: 100 INJECTION, SOLUTION INTRAVENOUS; SUBCUTANEOUS at 11:48

## 2019-06-16 RX ADMIN — INSULIN LISPRO 2 UNITS: 100 INJECTION, SOLUTION INTRAVENOUS; SUBCUTANEOUS at 16:47

## 2019-06-16 NOTE — PROGRESS NOTES
Hospitalist Progress Note                               Kyle Leiva MD                                     Answering service: [phone]                               OR [phone] from in house phone                                         Date of Service:  2019  NAME:  Hanh Luz  :  1980  MRN:  [id number]      Admission Summary:     42 Y/O female with recently diagnosed Diabetes mellitus with initiation of Metformin 1000mg twice daily, was referred to the ER by her PCP for persistent Hyperglycemia with an A1C of 14. Patient C/O intermittent nausea, generalized weakness and an approximately 75-pound weight loss in the past year. Reason for follow up:      CC: Nausea, generalized weakness, weight loss and Hyperglycemia. No new complaints. Wants to go home. BS ranging 167- 326     Assessment & Plan:     1) New onset DM type 2 presenting in DKA: POA  Uncontrolled DM with hyperglycemia  A1C 14   Blood glucose uncontrolled. Increase NPH Insulin to 20 units twice a day. Increase Lispro to 6 units three times a day with meals, sliding scale insulin, diabetic teaching. Consult endocrine for evaluation. Needs out-patient follow up with endocrinology    2) Electrolytes: Resolved probable mild Psuedohyponatremia due to severe Hyperglycemia present on admission. Resolved Hypomagnesemia and Hypokalemia. 3) ID: abnormal urinalysis:   Afebrile, urine culture. Monitor off antibiotics. 4) Prophylaxis: DVT. 5) Directives: Full Code. 6) Dispo: Likely d/c tomorrow with OP follow up with endocrine    Hospital Problems  Never Reviewed          Codes Class Noted POA    DKA (diabetic ketoacidoses) ([facility] [address] ICD-10-CM: E13.10  ICD-9-CM: 250.10  2019 Unknown            Physical Examination:      Last 24hrs VS reviewed since prior progress note.  Most recent are:  Visit Vitals  /71 (BP 1 Location: Right arm, BP Patient Position: Sitting) Pulse 69   Temp 98.4 °F (36.9 °C)   Resp 16   Ht 5' 4\" (1.626 m)   Wt 115 lb 4.8 oz (52.3 kg)   SpO2 99%   Breastfeeding? No   BMI 19.79 kg/m²           Constitutional:  No acute distress, cooperative, pleasant    HEENT: Head is a traumatic,  Un icteric sclera. Pink conjunctiva,no erythema or discharge. Oral mucous moist, oropharynx benign. Neck supple,    Resp:  CTA bilaterally. No wheezing/rhonchi/rales. No accessory muscle use   CV:  Regular rhythm, normal rate, no murmurs, gallops, rubs    GI:  Soft, non distended, non tender. normoactive bowel sounds, no hepatosplenomegaly    :  No CVA or suprapubic tenderness   Skin  :  No erythema,rash,bullae,dipigmentation     Musculoskeletal:  No edema, warm, 2+ pulses throughout    Neurologic:  AAOx3, CN II-XII reviewed. Moves all extremities. No intake or output data in the 24 hours ending 06/16/19 1255           Labs:     Recent Labs     06/14/19  1038 06/13/19  1338   WBC 5.7 5.4   HGB 15.1 14.9   HCT 44.3 42.1    270     Recent Labs     06/16/19  0637 06/15/19  0615 06/13/19  1908 06/13/19  1600 06/13/19  1338    137 139  --  134*   K 4.2 3.9 3.4*  --  4.0    104 109*  --  97   CO2 28 28 23  --  20*   BUN 22* 17 10  --  13   CREA 0.59 0.48* 0.51*  --  0.70   * 250* 166*  --  346*   CA 8.7 8.8 8.3*  --  9.8   MG  --   --  1.7  --  1.4*   PHOS  --   --   --  3.8  --      Recent Labs     06/15/19  0615 06/13/19  1338   SGOT 19 16   ALT 18 22   AP 51 62   TBILI 0.4 0.6   TP 5.9* 7.2   ALB 3.1* 3.9   GLOB 2.8 3.3   LPSE  --  263     No results for input(s): INR, PTP, APTT in the last 72 hours. No lab exists for component: INREXT, INREXT   No results for input(s): FE, TIBC, PSAT, FERR in the last 72 hours. No results found for: FOL, RBCF   No results for input(s): PH, PCO2, PO2 in the last 72 hours. No results for input(s): CPK, CKNDX, TROIQ in the last 72 hours.     No lab exists for component: CPKMB  Lab Results   Component Value Date/Time    Cholesterol, total 148 06/13/2019 10:45 AM    HDL Cholesterol 33 06/13/2019 10:45 AM    LDL, calculated 61 06/13/2019 10:45 AM    Triglyceride 270 (H) 06/13/2019 10:45 AM    CHOL/HDL Ratio 4.5 06/13/2019 10:45 AM     Lab Results   Component Value Date/Time    Glucose (POC) 326 (H) 06/16/2019 11:24 AM    Glucose (POC) 320 (H) 06/16/2019 08:30 AM    Glucose (POC) 300 (H) 06/16/2019 06:53 AM    Glucose (POC) 167 (H) 06/15/2019 10:35 PM    Glucose (POC) 225 (H) 06/15/2019 04:18 PM     Lab Results   Component Value Date/Time    Color YELLOW/STRAW 06/13/2019 03:06 PM    Appearance CLEAR 06/13/2019 03:06 PM    Specific gravity 1.025 06/13/2019 03:06 PM    pH (UA) 5.5 06/13/2019 03:06 PM    Protein NEGATIVE  06/13/2019 03:06 PM    Glucose >1,000 (A) 06/13/2019 03:06 PM    Ketone >80 (A) 06/13/2019 03:06 PM    Bilirubin NEGATIVE  06/13/2019 03:06 PM    Urobilinogen 0.2 06/13/2019 03:06 PM    Nitrites NEGATIVE  06/13/2019 03:06 PM    Leukocyte Esterase NEGATIVE  06/13/2019 03:06 PM    Epithelial cells MANY (A) 06/13/2019 03:06 PM    Bacteria 2+ (A) 06/13/2019 03:06 PM    WBC 20-50 06/13/2019 03:06 PM    RBC 10-20 06/13/2019 03:06 PM         Medications Reviewed:     Current Facility-Administered Medications   Medication Dose Route Frequency    insulin NPH (NOVOLIN N, HUMULIN N) injection 20 Units  20 Units SubCUTAneous ACB&D    insulin lispro (HUMALOG) injection 6 Units  6 Units SubCUTAneous TIDAC    sodium chloride (NS) flush 5-40 mL  5-40 mL IntraVENous Q8H    sodium chloride (NS) flush 5-40 mL  5-40 mL IntraVENous PRN    glucagon (GLUCAGEN) injection 1 mg  1 mg IntraMUSCular PRN    acetaminophen (TYLENOL) tablet 650 mg  650 mg Oral Q6H PRN    insulin lispro (HUMALOG) injection   SubCUTAneous AC&HS    glucose chewable tablet 16 g  4 Tab Oral PRN    dextrose (D50W) injection syrg 12.5-25 g  12.5-25 g IntraVENous PRN     ______________________________________________________________________  EXPECTED LENGTH OF STAY: 2d 2h  ACTUAL LENGTH OF STAY:          3                 Yasmany Sandoval MD

## 2019-06-16 NOTE — PROGRESS NOTES
Patient used personal insulin device to check sugar and had results of 60 which were told to RN. Rechecked patient's blood glucose with unit glucometer with a result of 84. Will give patient a snack, continue to monitor, and check blood glucose again before next meal.     Patient . MD paged with results and received verbal order to hold 6 units scheduled Humalog and to administer sliding scale 2 units Humalog with 20 units scheduled NPH. Will continue to monitor.

## 2019-06-16 NOTE — PROGRESS NOTES
Bedside and Verbal shift change report given to [address] [place] [address], [address], RN (oncoming nurse) by Dejuan Weeks RN (offgoing nurse). Report included the following information SBAR, Kardex, Intake/Output, MAR and Recent Results.

## 2019-06-16 NOTE — CONSULTS
Endocrinology Consult    40 yo woman with new onset diabetes and history of 75 lb wt loss - some intentional and some not. Was treated with insulin infusion, now receiving SC insulin. She has been receiving a substantial amount of insulin for her size (52 kg on admission). Uncertain of family hx. She was seen by DTC yesterday and provided education. She lacks insurance and thus NPH was chosen while inpatient with plans to change to 70/30. Recommendations:    1. Diabetes - new onset  Unclear if type II or latent autoimmune type I  With apparent worsening of glucoses and further weight loss after intentional weight loss (should make insulin resistance better), I do wonder if she has autoimmune diabetes. Will ask her about family history    - In AM, ordered c-peptide (measurement of endogenous insulin production) to be measured with fasting, 4 am labs. This would be expected to be normal or high with type II and low with type I  - checking antibodies - FARHANA and antipancreatic antibodies in AM.    If she does have type I, she would do better with basal insulin and Humalog with meals. This would need to be attained from patient assistance as she lacks insurance. Will see how she dose overnight with NPH 20 units at 4 pm today. Will see in AM tomorrow prior to 8:30.

## 2019-06-16 NOTE — PROGRESS NOTES
Bedside shift change report given to Marcelina Benavidez (oncoming nurse) by Vargas Gutierres RN (offgoing nurse). Report included the following information SBAR and Kardex.

## 2019-06-16 NOTE — PROGRESS NOTES
Spiritual Care Partner Volunteer visited patient in room 216/01 on 6.16.19. Documented by: : Rev. Tyler Oconnor.  Mahogany Martinez; [facility], to contact [phone] call: [phone]

## 2019-06-17 VITALS
OXYGEN SATURATION: 99 % | HEIGHT: 64 IN | BODY MASS INDEX: 19.68 KG/M2 | RESPIRATION RATE: 16 BRPM | DIASTOLIC BLOOD PRESSURE: 73 MMHG | SYSTOLIC BLOOD PRESSURE: 111 MMHG | TEMPERATURE: 98.3 F | WEIGHT: 115.3 LBS | HEART RATE: 64 BPM

## 2019-06-17 LAB
ANION GAP SERPL CALC-SCNC: 5 MMOL/L (ref 5–15)
BUN SERPL-MCNC: 22 MG/DL (ref 6–20)
BUN/CREAT SERPL: 45 (ref 12–20)
CALCIUM SERPL-MCNC: 8.9 MG/DL (ref 8.5–10.1)
CHLORIDE SERPL-SCNC: 105 MMOL/L (ref 97–108)
CO2 SERPL-SCNC: 30 MMOL/L (ref 21–32)
CREAT SERPL-MCNC: 0.49 MG/DL (ref 0.55–1.02)
GLUCOSE BLD STRIP.AUTO-MCNC: 199 MG/DL (ref 65–100)
GLUCOSE SERPL-MCNC: 196 MG/DL (ref 65–100)
POTASSIUM SERPL-SCNC: 4.2 MMOL/L (ref 3.5–5.1)
SERVICE CMNT-IMP: ABNORMAL
SODIUM SERPL-SCNC: 140 MMOL/L (ref 136–145)

## 2019-06-17 PROCEDURE — 84681 ASSAY OF C-PEPTIDE: CPT

## 2019-06-17 PROCEDURE — 86341 ISLET CELL ANTIBODY: CPT

## 2019-06-17 PROCEDURE — 74011636637 HC RX REV CODE- 636/637: Performed by: HOSPITALIST

## 2019-06-17 PROCEDURE — 36415 COLL VENOUS BLD VENIPUNCTURE: CPT

## 2019-06-17 PROCEDURE — 82962 GLUCOSE BLOOD TEST: CPT

## 2019-06-17 PROCEDURE — 80048 BASIC METABOLIC PNL TOTAL CA: CPT

## 2019-06-17 RX ORDER — INSULIN LISPRO 100 [IU]/ML
INJECTION, SOLUTION INTRAVENOUS; SUBCUTANEOUS
Qty: 1 VIAL | Refills: 3 | Status: SHIPPED | OUTPATIENT
Start: 2019-06-17 | End: 2019-07-02

## 2019-06-17 RX ADMIN — INSULIN LISPRO 3 UNITS: 100 INJECTION, SOLUTION INTRAVENOUS; SUBCUTANEOUS at 07:46

## 2019-06-17 RX ADMIN — HUMAN INSULIN 20 UNITS: 100 INJECTION, SUSPENSION SUBCUTANEOUS at 07:44

## 2019-06-17 RX ADMIN — INSULIN LISPRO 2 UNITS: 100 INJECTION, SOLUTION INTRAVENOUS; SUBCUTANEOUS at 07:46

## 2019-06-17 NOTE — ROUTINE PROCESS
Bedside shift change report given to David (oncoming nurse) by Rhonda Gustafson (offgoing nurse). Report included the following information SBAR, Kardex, MAR and Recent Results.

## 2019-06-17 NOTE — PROGRESS NOTES
Problem: Falls - Risk of  Goal: *Absence of Falls  Description  Document Bee Cates Fall Risk and appropriate interventions in the flowsheet.   Outcome: Progressing Towards Goal

## 2019-06-17 NOTE — PROGRESS NOTES
Endocrinology Progress Note    Mrs Daria Price was seen this AM  Please see yesterday's note for initial impressions. She confirmed that 2 years ago she had an intentional weight loss from 185 lbs to 125 lbs. In the last month she lost an additional 10 lbs without trying and noted a marked increase in fatigue. She drinks a lot of water and urinates a lot at baseline, so did not notice an increase in urination. NPH 20 units given last evening. She was only given 2 units correction with dinner last night. Glucose was 126 at bedtime and 199 this AM.    Family hx- neither parent has type II diabetes. 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 - Suspect glucose toxicity will resolve and her insulin sensitivity and any residual pancreatic function will improve. Discussed this with her. She may need less insulin in a few days. Encouraged close monitoring and a decrease in dose should she have lower glucoses. NPH 10 units twice daily would be closer to an expected dose for a person her size. - reviewed with her the importance of paying attention to carbohydrate intake as this is the main variable affecting glucose levels  - discussed affect of exercise as well (she is a  and exercises often) on glucoses and how high intensity resistance exercise can raise values and more sustained cardiovascular activity can lower values. 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 Will see if she can come at the end of the workday on the 2nd or 5th of July. We will call her to schedule this.    Discussed potentially changing insulin regimen at that time.        I spent 30 minutes on her care and > 50% of the time was spent in coordination of her care

## 2019-06-17 NOTE — PROGRESS NOTES
Bedside shift change report given to Rahul Borges RN (oncoming nurse) by Joana Mike (offgoing nurse). Report included the following information SBAR, Kardex and MAR.

## 2019-06-17 NOTE — DISCHARGE SUMMARY
Hospitalist Discharge Summary     Patient ID:  Rene Lancaster  [id number]  37 y.o.  1980 6/13/2019    PCP on record: Edgar Waldrop NP    Admit date: 6/13/2019  Discharge date and time: 6/17/2019    DISCHARGE DIAGNOSIS:    1) New onset DM type 2 presenting in DKA: POA  Uncontrolled DM with hyperglycemia  A1C 14   BS this AM <200. Will continue will NPH Insulin to 20 units twice a day. Lispro to 6 units three times a day with meals, sliding scale insulin,she got already  diabetic teaching. Needs out-patient follow up with endocrinology     2) Electrolytes: Resolved probable mild Psuedohyponatremia due to severe Hyperglycemia present on admission. Resolved Hypomagnesemia and Hypokalemia.    3) ID: abnormal urinalysis:   Afebrile, no symptoms               Reason for follow up:      CC: Nausea, generalized weakness, weight loss and Hyperglycemia.     No new complaints. Wants to go home. BS ranging 167- 326      Assessment & Plan:   1) New onset DM type 2 presenting in DKA: POA  Uncontrolled DM with hyperglycemia  A1C 14   BS this AM <200. aitng ok , feeling well. GAP normal.   Will continue will NPH Insulin to 20 units twice a day. Lispro to 6 units three times a day with meals, sliding scale insulin,she got already  diabetic teaching. Needs out-patient follow up with endocrinology     2) Electrolytes: Resolved probable mild Psuedohyponatremia due to severe Hyperglycemia present on admission. Resolved Hypomagnesemia and Hypokalemia.    3) ID: abnormal urinalysis:   Afebrile, no symptoms             CONSULTATIONS:  IP CONSULT TO HOSPITALIST  IP CONSULT TO ENDOCRINOLOGY    Excerpted HPI from H&P of Fe Fonseca MD:  42 Y/O female with recently diagnosed Diabetes mellitus with initiation of Metformin 1000mg twice daily, was referred to the ER by her PCP for persistent Hyperglycemia with an A1C of 14.  Patient C/O intermittent nausea, generalized weakness and an approximately 75-pound weight loss in the past year.    ______________________________________________________________________  DISCHARGE SUMMARY/HOSPITAL COURSE:  for full details see H&P, daily progress notes, labs, consult notes. patient responded well to medical management, no complications during this hospitalization. Refer to above plan.      _______________________________________________________________________  Patient seen and examined by me on discharge day. Pertinent Findings:  Gen:    Not in distress  Chest: Clear lungs  CVS:   Regular rhythm. No edema  Abd:  Soft, not distended, not tender  Neuro:  Alert, orientedx3  _______________________________________________________________________  DISCHARGE MEDICATIONS:   Current Discharge Medication List      START taking these medications    Details   insulin NPH (NOVOLIN N, HUMULIN N) 100 unit/mL injection 20 units twice a day morning and night  Indications: type 2 diabetes mellitus  Qty: 1 Vial, Refills: 3      insulin lispro (HUMALOG) 100 unit/mL injection 3 units before meals  Qty: 1 Vial, Refills: 3               Patient Follow Up Instructions:    Activity: ad libid  Diet: diabetic  Wound Care: NA    Follow-up with PCP in 1 week, endocrinology in 3-4 weeks  Follow-up tests/labs   Follow-up Information     Follow up With Specialties Details Why Contact Alexandra Bartholomew NP Nurse Practitioner   [address] [place] [address]  [phone]          ________________________________________________________________    Risk of deterioration: low    Condition at Discharge:  Stable  __________________________________________________________________    Disposition  Home    ____________________________________________________________________    Code Status: Full  ___________________________________________________________________      Total time in minutes spent coordinating this discharge (includes going over instructions, follow-up, prescriptions, and preparing report for sign off to her PCP) :  35 minutes    Signed:  Pravin Mazariegos MD

## 2019-06-17 NOTE — PROGRESS NOTES
Problem: Falls - Risk of  Goal: *Absence of Falls  Description  Document Nirav Ash Fall Risk and appropriate interventions in the flowsheet.   Outcome: Resolved/Not Met     Problem: Patient Education: Go to Patient Education Activity  Goal: Patient/Family Education  Outcome: Resolved/Not Met     Problem: Patient Education: Go to Patient Education Activity  Goal: Patient/Family Education  Outcome: Resolved/Not Met     Problem: DKA: Day 1  Goal: Off Pathway (Use only if patient is Off Pathway)  Outcome: Resolved/Not Met  Goal: Activity/Safety  Outcome: Resolved/Not Met  Goal: Consults, if ordered  Outcome: Resolved/Not Met  Goal: Diagnostic Tests/Procedures, if Ordered  Outcome: Resolved/Not Met  Goal: Nutrition/Diet  Outcome: Resolved/Not Met  Goal: Discharge Planning  Outcome: Resolved/Not Met  Goal: Medications  Outcome: Resolved/Not Met  Goal: Respiratory  Outcome: Resolved/Not Met  Goal: Treatments/Interventions/Procedures  Outcome: Resolved/Not Met  Goal: Psychosocial  Outcome: Resolved/Not Met  Goal: *Hemodynamically stable  Outcome: Resolved/Not Met  Goal: *Blood glucose falling 50 to 100 mg/dl/hr  Outcome: Resolved/Not Met  Goal: *Potassium normalizing  Outcome: Resolved/Not Met     Problem: DKA: Day 2  Goal: Off Pathway (Use only if patient is Off Pathway)  Outcome: Resolved/Not Met  Goal: Activity/Safety  Outcome: Resolved/Not Met  Goal: Consults, if ordered  Outcome: Resolved/Not Met  Goal: Diagnostic Test/Procedures  Outcome: Resolved/Not Met  Goal: Nutrition/Diet  Outcome: Resolved/Not Met  Goal: Discharge Planning  Outcome: Resolved/Not Met  Goal: Medications  Outcome: Resolved/Not Met  Goal: Respiratory  Outcome: Resolved/Not Met  Goal: Treatments/Interventions/Procedures  Outcome: Resolved/Not Met  Goal: Psychosocial  Outcome: Resolved/Not Met  Goal: *Acidosis resolved  Outcome: Resolved/Not Met  Goal: *Tolerating diet  Outcome: Resolved/Not Met  Goal: *Demonstrates progressive activity  Outcome: Resolved/Not Met  Goal: *Blood glucose 80 to 180 mg/dl  Outcome: Resolved/Not Met     Problem: DKA: Day 3  Goal: Off Pathway (Use only if patient is Off Pathway)  Outcome: Resolved/Not Met  Goal: Activity/Safety  Outcome: Resolved/Not Met  Goal: Diagnostic Test/Procedures  Outcome: Resolved/Not Met  Goal: Nutrition/Diet  Outcome: Resolved/Not Met  Goal: Discharge Planning  Outcome: Resolved/Not Met  Goal: Medications  Outcome: Resolved/Not Met  Goal: Treatments/Interventions/Procedures  Outcome: Resolved/Not Met  Goal: Psychosocial  Outcome: Resolved/Not Met     Problem: DKA: Discharge Outcomes  Goal: *Ambulates and performs ADL's  Outcome: Resolved/Not Met  Goal: *Describes follow-up/return visits to physicians, diabetes treatment coordinator and other resources  Outcome: Resolved/Not Met  Goal: *Blood glucose at patient's target range  Outcome: Resolved/Not Met  Goal: *Acidosis resolved  Outcome: Resolved/Not Met  Goal: *Tolerating diet  Outcome: Resolved/Not Met  Goal: *Verbalizes understanding and describes prescribed diet  Outcome: Resolved/Not Met  Goal: *Describes blood glucose goals, monitoring, sick day rules, hypo/hyperglycemia  Outcome: Resolved/Not Met  Goal: *Describes available resources and support systems  Outcome: Resolved/Not Met  Goal: *Verbalizes name, dosage, time, side effects, and number of days to continue medications  Outcome: Resolved/Not Met  Goal: *Demonstrates ability to self-administer insulin  Outcome: Resolved/Not Met     Problem: Diabetes Self-Management  Goal: *Disease process and treatment process  Description  Define diabetes and identify own type of diabetes; list 3 options for treating diabetes. Outcome: Resolved/Not Met  Goal: *Incorporating nutritional management into lifestyle  Description  Describe effect of type, amount and timing of food on blood glucose; list 3 methods for planning meals.   Outcome: Resolved/Not Met  Goal: *Incorporating physical activity into lifestyle  Description  State effect of exercise on blood glucose levels. Outcome: Resolved/Not Met  Goal: *Developing strategies to promote health/change behavior  Description  Define the ABC's of diabetes; identify appropriate screenings, schedule and personal plan for screenings. Outcome: Resolved/Not Met  Goal: *Using medications safely  Description  State effect of diabetes medications on diabetes; name diabetes medication taking, action and side effects. Outcome: Resolved/Not Met  Goal: *Monitoring blood glucose, interpreting and using results  Description  Identify recommended blood glucose targets  and personal targets. Outcome: Resolved/Not Met  Goal: *Prevention, detection, treatment of acute complications  Description  List symptoms of hyper- and hypoglycemia; describe how to treat low blood sugar and actions for lowering  high blood glucose level. Outcome: Resolved/Not Met  Goal: *Prevention, detection and treatment of chronic complications  Description  Define the natural course of diabetes and describe the relationship of blood glucose levels to long term complications of diabetes.   Outcome: Resolved/Not Met  Goal: *Developing strategies to address psychosocial issues  Description  Describe feelings about living with diabetes; identify support needed and support network  Outcome: Resolved/Not Met  Goal: *Insulin pump training  Outcome: Resolved/Not Met  Goal: *Sick day guidelines  Outcome: Resolved/Not Met  Goal: *Patient Specific Goal (EDIT GOAL, INSERT TEXT)  Outcome: Resolved/Not Met     Problem: Patient Education: Go to Patient Education Activity  Goal: Patient/Family Education  Outcome: Resolved/Not Met

## 2019-06-18 LAB
C PEPTIDE SERPL-MCNC: 0.5 NG/ML (ref 1.1–4.4)
PANC ISLET CELL AB TITR SER: NEGATIVE {TITER}

## 2019-06-19 LAB — GAD65 AB SER-ACNC: 25.8 U/ML (ref 0–5)

## 2019-06-21 ENCOUNTER — HOSPITAL ENCOUNTER (EMERGENCY)
Age: 39
Discharge: HOME | End: 2019-06-22
Attending: EMERGENCY MEDICINE
Payer: SELF-PAY

## 2019-06-21 DIAGNOSIS — R07.9 CHEST PAIN, UNSPECIFIED TYPE: ICD-10-CM

## 2019-06-21 DIAGNOSIS — R10.84 ABDOMINAL PAIN, GENERALIZED: ICD-10-CM

## 2019-06-21 DIAGNOSIS — M79.89 LEG SWELLING: Primary | ICD-10-CM

## 2019-06-21 LAB
ALBUMIN SERPL-MCNC: 3.5 G/DL (ref 3.5–5)
ALBUMIN/GLOB SERPL: 1.2 {RATIO} (ref 1.1–2.2)
ALP SERPL-CCNC: 47 U/L (ref 45–117)
ALT SERPL-CCNC: 37 U/L (ref 12–78)
ANION GAP SERPL CALC-SCNC: 6 MMOL/L (ref 5–15)
AST SERPL-CCNC: 33 U/L (ref 15–37)
BASOPHILS # BLD: 0 K/UL (ref 0–0.1)
BASOPHILS NFR BLD: 1 % (ref 0–1)
BILIRUB SERPL-MCNC: 0.3 MG/DL (ref 0.2–1)
BNP SERPL-MCNC: 87 PG/ML
BUN SERPL-MCNC: 24 MG/DL (ref 6–20)
BUN/CREAT SERPL: 38 (ref 12–20)
CALCIUM SERPL-MCNC: 8.9 MG/DL (ref 8.5–10.1)
CHLORIDE SERPL-SCNC: 107 MMOL/L (ref 97–108)
CO2 SERPL-SCNC: 29 MMOL/L (ref 21–32)
COMMENT, HOLDF: NORMAL
CREAT SERPL-MCNC: 0.64 MG/DL (ref 0.55–1.02)
D DIMER PPP FEU-MCNC: 0.32 MG/L FEU (ref 0–0.65)
DIFFERENTIAL METHOD BLD: ABNORMAL
EOSINOPHIL # BLD: 0.1 K/UL (ref 0–0.4)
EOSINOPHIL NFR BLD: 3 % (ref 0–7)
ERYTHROCYTE [DISTWIDTH] IN BLOOD BY AUTOMATED COUNT: 12.2 % (ref 11.5–14.5)
GLOBULIN SER CALC-MCNC: 2.9 G/DL (ref 2–4)
GLUCOSE BLD STRIP.AUTO-MCNC: 104 MG/DL (ref 65–100)
GLUCOSE SERPL-MCNC: 82 MG/DL (ref 65–100)
HCT VFR BLD AUTO: 36.7 % (ref 35–47)
HGB BLD-MCNC: 12.2 G/DL (ref 11.5–16)
IMM GRANULOCYTES # BLD AUTO: 0 K/UL (ref 0–0.04)
IMM GRANULOCYTES NFR BLD AUTO: 1 % (ref 0–0.5)
LYMPHOCYTES # BLD: 1.4 K/UL (ref 0.8–3.5)
LYMPHOCYTES NFR BLD: 29 % (ref 12–49)
MCH RBC QN AUTO: 29.1 PG (ref 26–34)
MCHC RBC AUTO-ENTMCNC: 33.2 G/DL (ref 30–36.5)
MCV RBC AUTO: 87.6 FL (ref 80–99)
MONOCYTES # BLD: 0.7 K/UL (ref 0–1)
MONOCYTES NFR BLD: 13 % (ref 5–13)
NEUTS SEG # BLD: 2.7 K/UL (ref 1.8–8)
NEUTS SEG NFR BLD: 54 % (ref 32–75)
NRBC # BLD: 0 K/UL (ref 0–0.01)
NRBC BLD-RTO: 0 PER 100 WBC
PLATELET # BLD AUTO: 253 K/UL (ref 150–400)
PMV BLD AUTO: 10.1 FL (ref 8.9–12.9)
POTASSIUM SERPL-SCNC: 3.6 MMOL/L (ref 3.5–5.1)
PROT SERPL-MCNC: 6.4 G/DL (ref 6.4–8.2)
RBC # BLD AUTO: 4.19 M/UL (ref 3.8–5.2)
SAMPLES BEING HELD,HOLD: NORMAL
SERVICE CMNT-IMP: ABNORMAL
SODIUM SERPL-SCNC: 142 MMOL/L (ref 136–145)
TROPONIN I SERPL-MCNC: <0.05 NG/ML
WBC # BLD AUTO: 4.9 K/UL (ref 3.6–11)

## 2019-06-21 PROCEDURE — 85025 COMPLETE CBC W/AUTO DIFF WBC: CPT

## 2019-06-21 PROCEDURE — 84484 ASSAY OF TROPONIN QUANT: CPT

## 2019-06-21 PROCEDURE — 82962 GLUCOSE BLOOD TEST: CPT

## 2019-06-21 PROCEDURE — 85379 FIBRIN DEGRADATION QUANT: CPT

## 2019-06-21 PROCEDURE — 36415 COLL VENOUS BLD VENIPUNCTURE: CPT

## 2019-06-21 PROCEDURE — 80053 COMPREHEN METABOLIC PANEL: CPT

## 2019-06-21 PROCEDURE — 99284 EMERGENCY DEPT VISIT MOD MDM: CPT

## 2019-06-21 PROCEDURE — 83880 ASSAY OF NATRIURETIC PEPTIDE: CPT

## 2019-06-21 PROCEDURE — 93005 ELECTROCARDIOGRAM TRACING: CPT

## 2019-06-22 VITALS
DIASTOLIC BLOOD PRESSURE: 69 MMHG | BODY MASS INDEX: 23.42 KG/M2 | SYSTOLIC BLOOD PRESSURE: 107 MMHG | RESPIRATION RATE: 18 BRPM | WEIGHT: 136.47 LBS | OXYGEN SATURATION: 100 % | TEMPERATURE: 98.1 F | HEART RATE: 71 BPM

## 2019-06-22 LAB
APPEARANCE UR: ABNORMAL
BACTERIA URNS QL MICRO: NEGATIVE /HPF
BILIRUB UR QL: NEGATIVE
CAOX CRY URNS QL MICRO: ABNORMAL
COLOR UR: ABNORMAL
EPITH CASTS URNS QL MICRO: ABNORMAL /LPF
GLUCOSE UR STRIP.AUTO-MCNC: NEGATIVE MG/DL
HGB UR QL STRIP: NEGATIVE
KETONES UR QL STRIP.AUTO: NEGATIVE MG/DL
LEUKOCYTE ESTERASE UR QL STRIP.AUTO: ABNORMAL
NITRITE UR QL STRIP.AUTO: NEGATIVE
PH UR STRIP: 5.5 [PH] (ref 5–8)
PROT UR STRIP-MCNC: NEGATIVE MG/DL
RBC #/AREA URNS HPF: ABNORMAL /HPF (ref 0–5)
SP GR UR REFRACTOMETRY: 1.02 (ref 1–1.03)
UR CULT HOLD, URHOLD: NORMAL
UROBILINOGEN UR QL STRIP.AUTO: 0.2 EU/DL (ref 0.2–1)
WBC URNS QL MICRO: ABNORMAL /HPF (ref 0–4)

## 2019-06-22 PROCEDURE — 81001 URINALYSIS AUTO W/SCOPE: CPT

## 2019-06-22 NOTE — ED PROVIDER NOTES
41 y.o. female with past medical history significant for DM (new dx as of June 2019) who presents ambulatory to the ED, accompanied by friend with chief complaint of BLE swelling and ttp, sudden onset about 2 days ago. Pt also reports CP (\"elephant sitting on chest\"), abd pain, SOB, abd distension and diarrhea (2nd to medication), but denies fever/chills, N/V, abnormal liver/kidney function. Pt notes that she was discharged from the hospital on 06/17/19 after being admitted for DKA, and has been taking her medications as prescribed. She reports >20 lb weight gain since discharge. There are no other acute medical concerns at this time. Negative Tobacco use; Negative EtOH use     PCP: Mattie Urbina NP    Note written by Balwinder Lopez, as dictated by Lissy Deluca MD 10:50 PM     The history is provided by the patient and medical records. No  was used. Past Medical History:   Diagnosis Date    Diabetes [facility])        Past Surgical History:   Procedure Laterality Date    HX ACL RECONSTRUCTION           No family history on file.     Social History     Socioeconomic History    Marital status: SINGLE     Spouse name: Not on file    Number of children: Not on file    Years of education: Not on file    Highest education level: Not on file   Occupational History    Not on file   Social Needs    Financial resource strain: Not on file    Food insecurity:     Worry: Not on file     Inability: Not on file    Transportation needs:     Medical: Not on file     Non-medical: Not on file   Tobacco Use    Smoking status: Never Smoker    Smokeless tobacco: Never Used   Substance and Sexual Activity    Alcohol use: Never     Frequency: Never    Drug use: Not on file    Sexual activity: Not on file   Lifestyle    Physical activity:     Days per week: Not on file     Minutes per session: Not on file    Stress: Not on file   Relationships    Social connections:     Talks on phone: Not on file     Gets together: Not on file     Attends Buddhism service: Not on file     Active member of club or organization: Not on file     Attends meetings of clubs or organizations: Not on file     Relationship status: Not on file    Intimate partner violence:     Fear of current or ex partner: Not on file     Emotionally abused: Not on file     Physically abused: Not on file     Forced sexual activity: Not on file   Other Topics Concern    Not on file   Social History Narrative    Not on file         ALLERGIES: Patient has no known allergies. Review of Systems   Constitutional: Negative for appetite change, chills and fever. HENT: Negative for congestion, nosebleeds and sore throat. Eyes: Negative for discharge. Respiratory: Positive for shortness of breath. Negative for cough. Cardiovascular: Positive for chest pain and leg swelling. Gastrointestinal: Positive for abdominal distention, abdominal pain and diarrhea. Negative for nausea and vomiting. Genitourinary: Negative for dysuria. Musculoskeletal: Positive for myalgias. Skin: Negative for rash. Neurological: Negative for weakness and headaches. Hematological: Negative for adenopathy. Psychiatric/Behavioral: Negative. All other systems reviewed and are negative. Vitals:    06/21/19 2113 06/21/19 2341 06/22/19 0233   BP:  103/64 107/69   Pulse: 88 71 71   Resp:  18 18   Temp:  98.1 °F (36.7 °C) 98.1 °F (36.7 °C)   SpO2: 98% 100% 100%   Weight:  61.9 kg (136 lb 7.4 oz)             Physical Exam   Constitutional: She is oriented to person, place, and time. She appears well-developed and well-nourished. HENT:   Head: Normocephalic and atraumatic. Mouth/Throat: Oropharynx is clear and moist.   Eyes: Conjunctivae are normal.   Neck: Normal range of motion. Neck supple. Cardiovascular: Normal rate, regular rhythm and normal heart sounds.    Pulmonary/Chest: Effort normal and breath sounds normal.   Abdominal: Soft. Bowel sounds are normal. There is no tenderness. Musculoskeletal: Normal range of motion. She exhibits no tenderness. Right lower leg: She exhibits edema (2+ non-pitting edema). Left lower leg: She exhibits edema (2+; non-pitting). Neurological: She is alert and oriented to person, place, and time. Skin: Skin is warm and dry. Psychiatric: She has a normal mood and affect. Her behavior is normal.   Nursing note and vitals reviewed. Note written by Balwinder Temple, as dictated by Kenna Harrison MD 10:50 PM     MDM       Procedures    ED EKG interpretation:  Rhythm: normal sinus rhythm; and regular . Rate (approx.): 82; Axis: normal; P wave: normal; QRS interval: normal ; ST/T wave: normal; interpreted by Kenna Harrison MD, ED MD.        2:30 AM  Reviewed results with the patient. She did not feel comfortable with discharge not knowing the cause of her weight gain and swelling. I offered admission. She declined and cited she would f/u with PCP. A/P:  1. Leg swelling - unclear etiology  2. Abdominal pain  3. Chest pain  4. Unexplained weight gain. Patient's results have been reviewed with them. Patient and/or family have verbally conveyed their understanding and agreement of the patient's signs, symptoms, diagnosis, treatment and prognosis and additionally agree to follow up as recommended or return to the Emergency Room should their condition change prior to follow-up. Discharge instructions have also been provided to the patient with some educational information regarding their diagnosis as well a list of reasons why they would want to return to the ER prior to their follow-up appointment should their condition change.

## 2019-06-22 NOTE — DISCHARGE INSTRUCTIONS
- Please wear compression hose. - Please follow-up with Dr. Leslie Schwartz. Call on Monday and let her know you were seen in the ED.  - Follow-up as advised. Patient Education        Leg and Ankle Edema: Care Instructions  Your Care Instructions  Swelling in the legs, ankles, and feet is called edema. It is common after you sit or stand for a while. Long plane flights or car rides often cause swelling in the legs and feet. You may also have swelling if you have to stand for long periods of time at your job. Problems with the veins in the legs (varicose veins) and changes in hormones can also cause swelling. Sometimes the swelling in the ankles and feet is caused by a more serious problem, such as heart failure, infection, blood clots, or liver or kidney disease. Follow-up care is a key part of your treatment and safety. Be sure to make and go to all appointments, and call your doctor if you are having problems. It's also a good idea to know your test results and keep a list of the medicines you take. How can you care for yourself at home? · If your doctor gave you medicine, take it as prescribed. Call your doctor if you think you are having a problem with your medicine. · Whenever you are resting, raise your legs up. Try to keep the swollen area higher than the level of your heart. · Take breaks from standing or sitting in one position. ? Walk around to increase the blood flow in your lower legs. ? Move your feet and ankles often while you stand, or tighten and relax your leg muscles. · Wear support stockings. Put them on in the morning, before swelling gets worse. · Eat a balanced diet. Lose weight if you need to. · Limit the amount of salt (sodium) in your diet. Salt holds fluid in the body and may increase swelling. When should you call for help? Call 911 anytime you think you may need emergency care. For example, call if:    · You have symptoms of a blood clot in your lung (called a pulmonary embolism). These may include:  ? Sudden chest pain. ? Trouble breathing. ? Coughing up blood.    Call your doctor now or seek immediate medical care if:    · You have signs of a blood clot, such as:  ? Pain in your calf, back of the knee, thigh, or groin. ? Redness and swelling in your leg or groin.     · You have symptoms of infection, such as:  ? Increased pain, swelling, warmth, or redness. ? Red streaks or pus. ? A fever.    Watch closely for changes in your health, and be sure to contact your doctor if:    · Your swelling is getting worse.     · You have new or worsening pain in your legs.     · You do not get better as expected. Where can you learn more? Go to http://briana-bran.info/. Enter U358 in the search box to learn more about \"Leg and Ankle Edema: Care Instructions. \"  Current as of: September 23, 2018  Content Version: 11.9  © 6725-6472 [employer]. Care instructions adapted under license by [employer] (which disclaims liability or warranty for this information). If you have questions about a medical condition or this instruction, always ask your healthcare professional. [place] [address] any warranty or liability for your use of this information. Patient Education        Chest Pain: Care Instructions  Your Care Instructions    There are many things that can cause chest pain. Some are not serious and will get better on their own in a few days. But some kinds of chest pain need more testing and treatment. Your doctor may have recommended a follow-up visit in the next 8 to 12 hours. If you are not getting better, you may need more tests or treatment. Even though your doctor has released you, you still need to watch for any problems. The doctor carefully checked you, but sometimes problems can develop later. If you have new symptoms or if your symptoms do not get better, get medical care right away.   If you have worse or different chest pain or pressure that lasts more than 5 minutes or you passed out (lost consciousness), call 911 or seek other emergency help right away. A medical visit is only one step in your treatment. Even if you feel better, you still need to do what your doctor recommends, such as going to all suggested follow-up appointments and taking medicines exactly as directed. This will help you recover and help prevent future problems. How can you care for yourself at home? · Rest until you feel better. · Take your medicine exactly as prescribed. Call your doctor if you think you are having a problem with your medicine. · Do not drive after taking a prescription pain medicine. When should you call for help? Call 911 if:    · You passed out (lost consciousness).     · You have severe difficulty breathing.     · You have symptoms of a heart attack. These may include:  ? Chest pain or pressure, or a strange feeling in your chest.  ? Sweating. ? Shortness of breath. ? Nausea or vomiting. ? Pain, pressure, or a strange feeling in your back, neck, jaw, or upper belly or in one or both shoulders or arms. ? Lightheadedness or sudden weakness. ? A fast or irregular heartbeat. After you call 911, the  may tell you to chew 1 adult-strength or 2 to 4 low-dose aspirin. Wait for an ambulance. Do not try to drive yourself.    Call your doctor today if:    · You have any trouble breathing.     · Your chest pain gets worse.     · You are dizzy or lightheaded, or you feel like you may faint.     · You are not getting better as expected.     · You are having new or different chest pain. Where can you learn more? Go to http://briana-bran.info/. Enter A120 in the search box to learn more about \"Chest Pain: Care Instructions. \"  Current as of: September 23, 2018  Content Version: 11.9  © 5026-8269 [place], [employer].  Care instructions adapted under license by [employer] (which disclaims liability or warranty for this information). If you have questions about a medical condition or this instruction, always ask your healthcare professional. [address] any warranty or liability for your use of this information. Patient Education        Abdominal Pain: Care Instructions  Your Care Instructions    Abdominal pain has many possible causes. Some aren't serious and get better on their own in a few days. Others need more testing and treatment. If your pain continues or gets worse, you need to be rechecked and may need more tests to find out what is wrong. You may need surgery to correct the problem. Don't ignore new symptoms, such as fever, nausea and vomiting, urination problems, pain that gets worse, and dizziness. These may be signs of a more serious problem. Your doctor may have recommended a follow-up visit in the next 8 to 12 hours. If you are not getting better, you may need more tests or treatment. The doctor has checked you carefully, but problems can develop later. If you notice any problems or new symptoms, get medical treatment right away. Follow-up care is a key part of your treatment and safety. Be sure to make and go to all appointments, and call your doctor if you are having problems. It's also a good idea to know your test results and keep a list of the medicines you take. How can you care for yourself at home? · Rest until you feel better. · To prevent dehydration, drink plenty of fluids, enough so that your urine is light yellow or clear like water. Choose water and other caffeine-free clear liquids until you feel better. If you have kidney, heart, or liver disease and have to limit fluids, talk with your doctor before you increase the amount of fluids you drink. · If your stomach is upset, eat mild foods, such as rice, dry toast or crackers, bananas, and applesauce. Try eating several small meals instead of two or three large ones.   · Wait until 48 hours after all symptoms have gone away before you have spicy foods, alcohol, and drinks that contain caffeine. · Do not eat foods that are high in fat. · Avoid anti-inflammatory medicines such as aspirin, ibuprofen (Advil, Motrin), and naproxen (Aleve). These can cause stomach upset. Talk to your doctor if you take daily aspirin for another health problem. When should you call for help? Call 911 anytime you think you may need emergency care. For example, call if:    · You passed out (lost consciousness).     · You pass maroon or very bloody stools.     · You vomit blood or what looks like coffee grounds.     · You have new, severe belly pain.    Call your doctor now or seek immediate medical care if:    · Your pain gets worse, especially if it becomes focused in one area of your belly.     · You have a new or higher fever.     · Your stools are black and look like tar, or they have streaks of blood.     · You have unexpected vaginal bleeding.     · You have symptoms of a urinary tract infection. These may include:  ? Pain when you urinate. ? Urinating more often than usual.  ? Blood in your urine.     · You are dizzy or lightheaded, or you feel like you may faint.    Watch closely for changes in your health, and be sure to contact your doctor if:    · You are not getting better after 1 day (24 hours). Where can you learn more? Go to http://briana-bran.info/. Enter I651 in the search box to learn more about \"Abdominal Pain: Care Instructions. \"  Current as of: September 23, 2018  Content Version: 11.9  © 1384-7089 [employer]. Care instructions adapted under license by [employer] (which disclaims liability or warranty for this information). If you have questions about a medical condition or this instruction, always ask your healthcare professional. [address] any warranty or liability for your use of this information.

## 2019-06-22 NOTE — ED NOTES
MD reviewed discharge instructions and options with patient and patient verbalized understanding. RN reviewed discharge instructions using teachback method. Pt ambulated to exit without difficulty and in no signs of acute distress escorted by family, and she  will drive home. No complaints or needs expressed at this time. Patient was counseled on medications prescribed at discharge. VSS, verbalized relief from most intense pain. Patient to call PCP in the morning for appointment.

## 2019-06-23 LAB
ATRIAL RATE: 82 BPM
CALCULATED P AXIS, ECG09: 8 DEGREES
CALCULATED R AXIS, ECG10: 20 DEGREES
CALCULATED T AXIS, ECG11: 48 DEGREES
DIAGNOSIS, 93000: NORMAL
P-R INTERVAL, ECG05: 186 MS
Q-T INTERVAL, ECG07: 370 MS
QRS DURATION, ECG06: 82 MS
QTC CALCULATION (BEZET), ECG08: 432 MS
VENTRICULAR RATE, ECG03: 82 BPM

## 2019-06-25 ENCOUNTER — TELEPHONE (OUTPATIENT)
Dept: ENDOCRINOLOGY | Age: 39
End: 2019-06-25

## 2019-06-25 DIAGNOSIS — E13.9 LADA (LATENT AUTOIMMUNE DIABETES OF ADULTHOOD), MANAGED AS TYPE 1 (HCC): ICD-10-CM

## 2019-06-25 NOTE — TELEPHONE ENCOUNTER
Called and left voicemail    Labs from hospital (on day of discharge) were consistent with type I diabetes - FARHANA antibody was positive and c-peptide was low at 0.5    Relayed this on her personal voicemail. Also recommended she call to schedule follow-up visit.

## 2019-07-02 ENCOUNTER — OFFICE VISIT (OUTPATIENT)
Dept: ENDOCRINOLOGY | Age: 39
End: 2019-07-02

## 2019-07-02 VITALS — HEIGHT: 64 IN | BODY MASS INDEX: 21.85 KG/M2 | WEIGHT: 128 LBS

## 2019-07-02 DIAGNOSIS — E13.9 LADA (LATENT AUTOIMMUNE DIABETES OF ADULTHOOD), MANAGED AS TYPE 1 (HCC): Primary | ICD-10-CM

## 2019-07-02 RX ORDER — INSULIN LISPRO 100 [IU]/ML
INJECTION, SOLUTION INTRAVENOUS; SUBCUTANEOUS
Qty: 3 VIAL | Refills: 0 | Status: SHIPPED | COMMUNITY
Start: 2019-07-02

## 2019-07-02 RX ORDER — FLASH GLUCOSE SENSOR
KIT MISCELLANEOUS
Qty: 2 KIT | Refills: 11 | Status: SHIPPED | OUTPATIENT
Start: 2019-07-02 | End: 2022-03-04

## 2019-07-02 RX ORDER — INSULIN DEGLUDEC 100 U/ML
INJECTION, SOLUTION SUBCUTANEOUS
Qty: 5 PEN | Refills: 0 | Status: SHIPPED | COMMUNITY
Start: 2019-07-02 | End: 2019-12-26

## 2019-07-02 NOTE — PROGRESS NOTES
History of Present Illness: Lydia Nova is a 41 y.o. female presents for follow-up of diabetes type I    She was diagnosed in 6/2019. she was seen by me during her hospital admission about 2 weeks ago. 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 Average glucose since discharge is about 116. Low glucoses are common following breakfast and dinner. She is recording her carbohydrate intake and meals meticulously in a One Touch reveal daniel. Having less urination. Weight is up about 13 lbs  Energy is better    Social:  Her mother accompanies her to today's visit  She is currently without insurance, but is waiting for Medicaid    Past Medical History:   Diagnosis Date    [facility] [address]     Type 1 diabetes ([facility]      Current Outpatient Medications   Medication Sig    insulin NPH (NOVOLIN N, HUMULIN N) 100 unit/mL injection 20 units twice a day morning and night  Indications: type 2 diabetes mellitus    insulin lispro (HUMALOG) 100 unit/mL injection 3 units before meals    metFORMIN (GLUCOPHAGE) 1,000 mg tablet Take 1,000 mg by mouth two (2) times daily (with meals). No current facility-administered medications for this visit.       No Known Allergies    Physical Examination:  Visit Vitals  Ht 5' 4\" (1.626 m)   Wt 128 lb (58.1 kg)   BMI 21.97 kg/m²   -   - General: pleasant, no distress, normal gait   HEENT: hearing intact, EOMI, clear sclera without icterus  - Cardiovascular: regular, normal rate   - Respiratory: normal effort  - Integumentary: no edema  - Psychiatric: normal mood and affect    Data Reviewed:   Lab Results   Component Value Date/Time    Hemoglobin A1c 14.0 06/13/2019 04:00 PM      Lab Results Component Value Date/Time    Sodium 142 06/21/2019 09:44 PM    Potassium 3.6 06/21/2019 09:44 PM    Creatinine 0.64 06/21/2019 09:44 PM        Lab Results   Component Value Date/Time    Cholesterol, total 148 06/13/2019 10:45 AM    HDL Cholesterol 33 06/13/2019 10:45 AM    LDL, calculated 61 06/13/2019 10:45 AM    Triglyceride 270 06/13/2019 10:45 AM      Component      Latest Ref Rng & Units 6/17/2019 6/17/2019 6/17/2019           4:21 AM  4:21 AM  4:21 AM   C-Peptide      1.1 - 4.4 ng/mL   0.5 (L)   FARHANA-65 Ab      0.0 - 5.0 U/mL 25.8 (H)     Antipancreatic Islet Cells      Neg:<1:1    NEGATIVE      Assessment/Plan:   1. BRENDAN (latent autoimmune diabetes of adulthood), managed as type 1 ([facility]   She is doing very good job taking her insulin, monitoring her carbohydrate intakes and monitoring her glucoses. I think she is having a little more hypoglycemia than desired. This may be due in part due to the combination of taking a mixed insulin and Humalog at the same time. Reviewed that the 20 units of 70/30 that she is taking contains 6 units of regular insulin with breakfast and dinner. Moreover, it appears the dinnertime long-acting component is not carrying her through the night  Recommended:  Tresiba 30 units daily. Reviewed how this would take in several days to reach stable levels. Recommended 20 units of 70/30 tonight, 15 in the morning, 10 units tomorrow night  Humalog 1 unit for every 10 g of carbohydrates  Humalog 1 unit to lower glucose 30 milligrams per deciliter for values greater than 150  See directions below for further details  Also discussed using the freestyle patrick continuous glucose system as she is currently spending a lot of money on test strips. She may actually spend less money using the class glucose system     Greater than 50% of 40 minute visit was spent counseling the patient about above. Patient Instructions   Diabetes:    1. Tresiba 30 units once daily in evening.    This may take 3 days to get to full effect    Follow glucose trend from bedtime to fasting to determine how this is working  Glucoses should be stable when not eating. 70/30 - take 20 units (6 units regular) with dinner tonight, 15 units (4.5 units regular)  in AM tomorrow and 10 units (3 units regular)  tomorrow night. 2. Humalog for carbohydrates:  - start with 1 unit for every 10 g carbohydrate with meals  - divide carbs by 10 to determine dose    Example - 60 g carb / 10 - take 6 units. 30 g carb - take 3 units      3. Humalog for high glucoses  - add to Humalog for carb intake  - do not take more frequently than every 4 hours    150-180: 1 unit  181-210: 2 units  211-240: 3 units  241-270: 4 units  271-300: 5 units  301-330: 6 units  331-360: 7 units  361-390: 8 units  391-420: 9 units  421-450: 10 units  450 :        11 units      Goals for blood glucose:  Fasting  (less than 150)  Lunch, Dinner, Bedtime -  (less than 180). Follow-up and Dispositions    · Return in about 2 months (around 9/2/2019).            Copy sent to:

## 2019-07-02 NOTE — PATIENT INSTRUCTIONS
Diabetes:    1. Tresiba 30 units once daily in evening. This may take 3 days to get to full effect    Follow glucose trend from bedtime to fasting to determine how this is working  Glucoses should be stable when not eating. 70/30 - take 20 units (6 units regular) with dinner tonight, 15 units (4.5 units regular)  in AM tomorrow and 10 units (3 units regular)  tomorrow night. 2. Humalog for carbohydrates:  - start with 1 unit for every 10 g carbohydrate with meals  - divide carbs by 10 to determine dose    Example - 60 g carb / 10 - take 6 units. 30 g carb - take 3 units      3. Humalog for high glucoses  - add to Humalog for carb intake  - do not take more frequently than every 4 hours    150-180: 1 unit  181-210: 2 units  211-240: 3 units  241-270: 4 units  271-300: 5 units  301-330: 6 units  331-360: 7 units  361-390: 8 units  391-420: 9 units  421-450: 10 units  450 :        11 units      Goals for blood glucose:  Fasting  (less than 150)  Lunch, Dinner, Bedtime -  (less than 180).

## 2019-10-16 ENCOUNTER — APPOINTMENT (OUTPATIENT)
Dept: GENERAL RADIOLOGY | Age: 39
End: 2019-10-16
Attending: STUDENT IN AN ORGANIZED HEALTH CARE EDUCATION/TRAINING PROGRAM
Payer: SELF-PAY

## 2019-10-16 ENCOUNTER — HOSPITAL ENCOUNTER (EMERGENCY)
Age: 39
Discharge: HOME | End: 2019-10-16
Attending: STUDENT IN AN ORGANIZED HEALTH CARE EDUCATION/TRAINING PROGRAM
Payer: SELF-PAY

## 2019-10-16 VITALS
SYSTOLIC BLOOD PRESSURE: 127 MMHG | HEIGHT: 65 IN | WEIGHT: 102.29 LBS | DIASTOLIC BLOOD PRESSURE: 101 MMHG | OXYGEN SATURATION: 100 % | RESPIRATION RATE: 18 BRPM | BODY MASS INDEX: 17.04 KG/M2 | HEART RATE: 67 BPM | TEMPERATURE: 97.9 F

## 2019-10-16 DIAGNOSIS — E87.6 HYPOKALEMIA: ICD-10-CM

## 2019-10-16 DIAGNOSIS — E13.65 OTHER SPECIFIED DIABETES MELLITUS WITH HYPERGLYCEMIA, WITH LONG-TERM CURRENT USE OF INSULIN (HCC): ICD-10-CM

## 2019-10-16 DIAGNOSIS — Z79.4 OTHER SPECIFIED DIABETES MELLITUS WITH HYPERGLYCEMIA, WITH LONG-TERM CURRENT USE OF INSULIN (HCC): ICD-10-CM

## 2019-10-16 DIAGNOSIS — R11.2 NON-INTRACTABLE VOMITING WITH NAUSEA, UNSPECIFIED VOMITING TYPE: Primary | ICD-10-CM

## 2019-10-16 LAB
ALBUMIN SERPL-MCNC: 4.5 G/DL (ref 3.5–5)
ALBUMIN/GLOB SERPL: 1.1 {RATIO} (ref 1.1–2.2)
ALP SERPL-CCNC: 65 U/L (ref 45–117)
ALT SERPL-CCNC: 17 U/L (ref 12–78)
AMORPH CRY URNS QL MICRO: ABNORMAL
ANION GAP SERPL CALC-SCNC: 15 MMOL/L (ref 5–15)
APPEARANCE UR: CLEAR
ARTERIAL PATENCY WRIST A: ABNORMAL
AST SERPL-CCNC: 15 U/L (ref 15–37)
ATRIAL RATE: 68 BPM
BACTERIA URNS QL MICRO: NEGATIVE /HPF
BASE EXCESS BLDV CALC-SCNC: 2 MMOL/L
BASOPHILS # BLD: 0 K/UL (ref 0–0.1)
BASOPHILS NFR BLD: 0 % (ref 0–1)
BDY SITE: ABNORMAL
BILIRUB SERPL-MCNC: 1 MG/DL (ref 0.2–1)
BILIRUB UR QL: NEGATIVE
BODY TEMPERATURE: 98.7
BUN SERPL-MCNC: 14 MG/DL (ref 6–20)
BUN/CREAT SERPL: 19 (ref 12–20)
CALCIUM SERPL-MCNC: 10.2 MG/DL (ref 8.5–10.1)
CALCULATED P AXIS, ECG09: 64 DEGREES
CALCULATED R AXIS, ECG10: 18 DEGREES
CALCULATED T AXIS, ECG11: 41 DEGREES
CHLORIDE SERPL-SCNC: 98 MMOL/L (ref 97–108)
CO2 SERPL-SCNC: 26 MMOL/L (ref 21–32)
COLOR UR: ABNORMAL
COMMENT, HOLDF: NORMAL
CREAT SERPL-MCNC: 0.73 MG/DL (ref 0.55–1.02)
DIAGNOSIS, 93000: NORMAL
DIFFERENTIAL METHOD BLD: ABNORMAL
EOSINOPHIL # BLD: 0 K/UL (ref 0–0.4)
EOSINOPHIL NFR BLD: 0 % (ref 0–7)
EPITH CASTS URNS QL MICRO: ABNORMAL /LPF
ERYTHROCYTE [DISTWIDTH] IN BLOOD BY AUTOMATED COUNT: 13 % (ref 11.5–14.5)
GAS FLOW.O2 O2 DELIVERY SYS: ABNORMAL L/MIN
GLOBULIN SER CALC-MCNC: 4.1 G/DL (ref 2–4)
GLUCOSE BLD STRIP.AUTO-MCNC: 191 MG/DL (ref 65–100)
GLUCOSE SERPL-MCNC: 204 MG/DL (ref 65–100)
GLUCOSE UR STRIP.AUTO-MCNC: 100 MG/DL
HCO3 BLDV-SCNC: 26.9 MMOL/L (ref 23–28)
HCT VFR BLD AUTO: 42.5 % (ref 35–47)
HGB BLD-MCNC: 14.6 G/DL (ref 11.5–16)
HGB UR QL STRIP: NEGATIVE
HYALINE CASTS URNS QL MICRO: ABNORMAL /LPF (ref 0–5)
IMM GRANULOCYTES # BLD AUTO: 0 K/UL (ref 0–0.04)
IMM GRANULOCYTES NFR BLD AUTO: 0 % (ref 0–0.5)
KETONES UR QL STRIP.AUTO: 15 MG/DL
LACTATE BLD-SCNC: 1.85 MMOL/L (ref 0.4–2)
LEUKOCYTE ESTERASE UR QL STRIP.AUTO: ABNORMAL
LIPASE SERPL-CCNC: 100 U/L (ref 73–393)
LYMPHOCYTES # BLD: 1 K/UL (ref 0.8–3.5)
LYMPHOCYTES NFR BLD: 15 % (ref 12–49)
MAGNESIUM SERPL-MCNC: 1.7 MG/DL (ref 1.6–2.4)
MCH RBC QN AUTO: 28.6 PG (ref 26–34)
MCHC RBC AUTO-ENTMCNC: 34.4 G/DL (ref 30–36.5)
MCV RBC AUTO: 83.3 FL (ref 80–99)
MONOCYTES # BLD: 0.4 K/UL (ref 0–1)
MONOCYTES NFR BLD: 5 % (ref 5–13)
MUCOUS THREADS URNS QL MICRO: ABNORMAL /LPF
NEUTS SEG # BLD: 5.2 K/UL (ref 1.8–8)
NEUTS SEG NFR BLD: 80 % (ref 32–75)
NITRITE UR QL STRIP.AUTO: NEGATIVE
NRBC # BLD: 0 K/UL (ref 0–0.01)
NRBC BLD-RTO: 0 PER 100 WBC
P-R INTERVAL, ECG05: 126 MS
PCO2 BLDV: 41.3 MMHG (ref 41–51)
PH BLDV: 7.42 [PH] (ref 7.32–7.42)
PH UR STRIP: 7 [PH] (ref 5–8)
PLATELET # BLD AUTO: 405 K/UL (ref 150–400)
PMV BLD AUTO: 9.8 FL (ref 8.9–12.9)
PO2 BLDV: 14 MMHG (ref 25–40)
POTASSIUM SERPL-SCNC: 3 MMOL/L (ref 3.5–5.1)
PROT SERPL-MCNC: 8.6 G/DL (ref 6.4–8.2)
PROT UR STRIP-MCNC: NEGATIVE MG/DL
Q-T INTERVAL, ECG07: 408 MS
QRS DURATION, ECG06: 82 MS
QTC CALCULATION (BEZET), ECG08: 433 MS
RBC # BLD AUTO: 5.1 M/UL (ref 3.8–5.2)
RBC #/AREA URNS HPF: ABNORMAL /HPF (ref 0–5)
SAMPLES BEING HELD,HOLD: NORMAL
SAO2 % BLDV: 17 % (ref 65–88)
SERVICE CMNT-IMP: ABNORMAL
SODIUM SERPL-SCNC: 139 MMOL/L (ref 136–145)
SP GR UR REFRACTOMETRY: 1.01 (ref 1–1.03)
SPECIMEN TYPE: ABNORMAL
TOTAL RESP. RATE, ITRR: 18
TROPONIN I SERPL-MCNC: <0.05 NG/ML
TROPONIN I SERPL-MCNC: <0.05 NG/ML
UR CULT HOLD, URHOLD: NORMAL
UROBILINOGEN UR QL STRIP.AUTO: 0.2 EU/DL (ref 0.2–1)
VENTRICULAR RATE, ECG03: 68 BPM
WBC # BLD AUTO: 6.7 K/UL (ref 3.6–11)
WBC URNS QL MICRO: ABNORMAL /HPF (ref 0–4)

## 2019-10-16 PROCEDURE — 74011250637 HC RX REV CODE- 250/637: Performed by: STUDENT IN AN ORGANIZED HEALTH CARE EDUCATION/TRAINING PROGRAM

## 2019-10-16 PROCEDURE — 96365 THER/PROPH/DIAG IV INF INIT: CPT

## 2019-10-16 PROCEDURE — 74011000250 HC RX REV CODE- 250: Performed by: STUDENT IN AN ORGANIZED HEALTH CARE EDUCATION/TRAINING PROGRAM

## 2019-10-16 PROCEDURE — 82803 BLOOD GASES ANY COMBINATION: CPT

## 2019-10-16 PROCEDURE — 82962 GLUCOSE BLOOD TEST: CPT

## 2019-10-16 PROCEDURE — 71045 X-RAY EXAM CHEST 1 VIEW: CPT

## 2019-10-16 PROCEDURE — 81001 URINALYSIS AUTO W/SCOPE: CPT

## 2019-10-16 PROCEDURE — 83605 ASSAY OF LACTIC ACID: CPT

## 2019-10-16 PROCEDURE — 93005 ELECTROCARDIOGRAM TRACING: CPT

## 2019-10-16 PROCEDURE — 36415 COLL VENOUS BLD VENIPUNCTURE: CPT

## 2019-10-16 PROCEDURE — 85025 COMPLETE CBC W/AUTO DIFF WBC: CPT

## 2019-10-16 PROCEDURE — C9113 INJ PANTOPRAZOLE SODIUM, VIA: HCPCS | Performed by: STUDENT IN AN ORGANIZED HEALTH CARE EDUCATION/TRAINING PROGRAM

## 2019-10-16 PROCEDURE — 84484 ASSAY OF TROPONIN QUANT: CPT

## 2019-10-16 PROCEDURE — 83690 ASSAY OF LIPASE: CPT

## 2019-10-16 PROCEDURE — 80053 COMPREHEN METABOLIC PANEL: CPT

## 2019-10-16 PROCEDURE — 74011000258 HC RX REV CODE- 258: Performed by: STUDENT IN AN ORGANIZED HEALTH CARE EDUCATION/TRAINING PROGRAM

## 2019-10-16 PROCEDURE — 74011250636 HC RX REV CODE- 250/636: Performed by: STUDENT IN AN ORGANIZED HEALTH CARE EDUCATION/TRAINING PROGRAM

## 2019-10-16 PROCEDURE — 83735 ASSAY OF MAGNESIUM: CPT

## 2019-10-16 PROCEDURE — 99285 EMERGENCY DEPT VISIT HI MDM: CPT

## 2019-10-16 PROCEDURE — 96374 THER/PROPH/DIAG INJ IV PUSH: CPT

## 2019-10-16 PROCEDURE — 96375 TX/PRO/DX INJ NEW DRUG ADDON: CPT

## 2019-10-16 PROCEDURE — 74011250636 HC RX REV CODE- 250/636

## 2019-10-16 RX ORDER — ONDANSETRON 4 MG/1
4 TABLET, ORALLY DISINTEGRATING ORAL
Qty: 9 TAB | Refills: 0 | Status: SHIPPED | OUTPATIENT
Start: 2019-10-16 | End: 2019-12-26

## 2019-10-16 RX ORDER — MAG HYDROX/ALUMINUM HYD/SIMETH 200-200-20
30 SUSPENSION, ORAL (FINAL DOSE FORM) ORAL
Qty: 300 ML | Refills: 0 | Status: SHIPPED | OUTPATIENT
Start: 2019-10-16 | End: 2019-12-26

## 2019-10-16 RX ORDER — ONDANSETRON 2 MG/ML
4 INJECTION INTRAMUSCULAR; INTRAVENOUS
Status: COMPLETED | OUTPATIENT
Start: 2019-10-16 | End: 2019-10-16

## 2019-10-16 RX ORDER — POTASSIUM CHLORIDE 750 MG/1
20 TABLET, FILM COATED, EXTENDED RELEASE ORAL DAILY
Qty: 10 TAB | Refills: 0 | Status: SHIPPED | OUTPATIENT
Start: 2019-10-16 | End: 2019-10-21

## 2019-10-16 RX ORDER — GABAPENTIN 300 MG/1
1600 CAPSULE ORAL 3 TIMES DAILY
COMMUNITY

## 2019-10-16 RX ORDER — ONDANSETRON 2 MG/ML
INJECTION INTRAMUSCULAR; INTRAVENOUS
Status: COMPLETED
Start: 2019-10-16 | End: 2019-10-16

## 2019-10-16 RX ORDER — POTASSIUM CHLORIDE 750 MG/1
40 TABLET, FILM COATED, EXTENDED RELEASE ORAL
Status: COMPLETED | OUTPATIENT
Start: 2019-10-16 | End: 2019-10-16

## 2019-10-16 RX ORDER — PROMETHAZINE HYDROCHLORIDE 25 MG/1
12.5 TABLET ORAL
Qty: 6 TAB | Refills: 0 | OUTPATIENT
Start: 2019-10-16 | End: 2019-12-26

## 2019-10-16 RX ORDER — FAMOTIDINE 20 MG/1
20 TABLET, FILM COATED ORAL
Status: COMPLETED | OUTPATIENT
Start: 2019-10-16 | End: 2019-10-16

## 2019-10-16 RX ORDER — FAMOTIDINE 20 MG/1
20 TABLET, FILM COATED ORAL 2 TIMES DAILY
Qty: 20 TAB | Refills: 0 | Status: SHIPPED | OUTPATIENT
Start: 2019-10-16 | End: 2019-10-26

## 2019-10-16 RX ADMIN — ONDANSETRON 4 MG: 2 INJECTION INTRAMUSCULAR; INTRAVENOUS at 10:03

## 2019-10-16 RX ADMIN — SODIUM CHLORIDE 1000 ML: 900 INJECTION, SOLUTION INTRAVENOUS at 10:03

## 2019-10-16 RX ADMIN — PROMETHAZINE HYDROCHLORIDE 12.5 MG: 25 INJECTION INTRAMUSCULAR; INTRAVENOUS at 12:01

## 2019-10-16 RX ADMIN — POTASSIUM CHLORIDE 40 MEQ: 750 TABLET, EXTENDED RELEASE ORAL at 12:39

## 2019-10-16 RX ADMIN — SODIUM CHLORIDE 40 MG: 9 INJECTION INTRAMUSCULAR; INTRAVENOUS; SUBCUTANEOUS at 12:01

## 2019-10-16 RX ADMIN — FAMOTIDINE 20 MG: 20 TABLET ORAL at 12:38

## 2019-10-16 NOTE — ED PROVIDER NOTES
Patient is a 42-year-old female with a past medical history of diabetes (patient reports type I, chart review reveals type II) on insulin presents ambulatory to the short [facility] emergency center in a private outside vehicle with a chief complaint of \"I have not been able keep anything down for 3 days. \"  Patient denies any triggers for onset of illness. Denies sick contacts. She reports that anything she eats or drinks causes her to vomit. Denies any hematemesis, melena, diarrhea. She reports she has not regularly checked her blood glucose levels and has not taken her short acting insulin \" because I have not felt well. \"  Reports a fever at home yesterday to 101.3. Also describes mild chest discomfort and epigastric pain with the vomiting. She denies headache, sore throat, cough, shortness of breath, urinary symptoms, rash. No other complaints today.            Past Medical History:   Diagnosis Date    Diabetes ([address]     Type 1 diabetes (HCC)        Past Surgical History:   Procedure Laterality Date    HX ACL RECONSTRUCTION           Family History:   Problem Relation Age of Onset   [address] [facility] [address] Arthritis-rheumatoid Mother     Asthma Mother     Thyroid Disease Mother     Cancer Father     Lung Cancer Father        Social History     Socioeconomic History    Marital status: UNKNOWN     Spouse name: Not on file    Number of children: Not on file    Years of education: Not on file    Highest education level: Not on file   Occupational History    Not on file   Social Needs    Financial resource strain: Not on file    Food insecurity:     Worry: Not on file     Inability: Not on file    Transportation needs:     Medical: Not on file     Non-medical: Not on file   Tobacco Use    Smoking status: Never Smoker    Smokeless tobacco: Never Used   Substance and Sexual Activity    Alcohol use: Never     Frequency: Never    Drug use: Not on file    Sexual activity: Not on file   Lifestyle    Physical activity: Days per week: Not on file     Minutes per session: Not on file    Stress: Not on file   Relationships    Social connections:     Talks on phone: Not on file     Gets together: Not on file     Attends Jainism service: Not on file     Active member of club or organization: Not on file     Attends meetings of clubs or organizations: Not on file     Relationship status: Not on file    Intimate partner violence:     Fear of current or ex partner: Not on file     Emotionally abused: Not on file     Physically abused: Not on file     Forced sexual activity: Not on file   Other Topics Concern    Not on file   Social History Narrative    Not on file         ALLERGIES: Patient has no known allergies. Review of Systems   Constitutional: Positive for activity change, appetite change, fatigue and fever. HENT: Negative for sore throat. Respiratory: Negative for cough and shortness of breath. Cardiovascular: Positive for chest pain. Gastrointestinal: Positive for abdominal pain, nausea and vomiting. Negative for blood in stool and diarrhea. Genitourinary: Negative for dysuria. Musculoskeletal: Negative for back pain. Skin: Negative for rash. Neurological: Negative for syncope and headaches. All other systems reviewed and are negative. There were no vitals filed for this visit. Physical Exam   Constitutional: She is oriented to person, place, and time. Non-toxic appearance. No distress. thin   HENT:   Head: Normocephalic and atraumatic. Eyes: Conjunctivae and EOM are normal.   Neck: Normal range of motion. Neck supple. Cardiovascular: Normal rate, regular rhythm and normal heart sounds. Pulmonary/Chest: Breath sounds normal. Tachypnea (mild; speaking in full sentences) noted. Abdominal: Soft. There is tenderness (mild, nonfocal). There is no rebound and no guarding. Musculoskeletal: Normal range of motion. She exhibits no edema.    Neurological: She is alert and oriented to person, place, and time. She exhibits normal muscle tone. Gait normal.   Skin: Skin is warm and dry. MDM       Procedures      EKG interpretation: 9:47  Rhythm: normal sinus rhythm; and regular . Rate (approx.): 68; Axis: normal; Intervals: normal ; ST/T wave: normal, no STEMI; ? U wave otherwise no significant or acute changes c/w 6/21/2019; EKG documented and interpreted by Jay Melvin MD, ED MD.      Assessment and plan: This is a 82-year-old female with insulin-dependent diabetes who presents with 3 days of nausea vomiting, malaise, epigastric and chest discomfort, and a low-grade fever yesterday. Large differential diagnosis at this point, including DKA, pneumonia, gastroenteritis, acute pancreatits, pyelonephritis, cholelithiasis, viral syndrome, dehydration. Less likely ACS, PE. Will perform broad work-up, fluids, Zofran. Disposition depends on results and clinical course. Pt tolerating PO intake. [address] stabilized today.

## 2019-10-16 NOTE — ED TRIAGE NOTES
TRIAGE NOTE: Patient arrived from home with c/o chest pain, vomiting, fatigue for the past 3 days. Patient is a type 1 diabetic, has not taken her short acting insulin in a couple days.

## 2019-10-16 NOTE — DISCHARGE INSTRUCTIONS
Patient Education        Nausea and Vomiting: Care Instructions  Your Care Instructions    When you are nauseated, you may feel weak and sweaty and notice a lot of saliva in your mouth. Nausea often leads to vomiting. Most of the time you do not need to worry about nausea and vomiting, but they can be signs of other illnesses. Two common causes of nausea and vomiting are stomach flu and food poisoning. Nausea and vomiting from viral stomach flu will usually start to improve within 24 hours. Nausea and vomiting from food poisoning may last from 12 to 48 hours. The doctor has checked you carefully, but problems can develop later. If you notice any problems or new symptoms, get medical treatment right away. Follow-up care is a key part of your treatment and safety. Be sure to make and go to all appointments, and call your doctor if you are having problems. It's also a good idea to know your test results and keep a list of the medicines you take. How can you care for yourself at home? · To prevent dehydration, drink plenty of fluids, enough so that your urine is light yellow or clear like water. Choose water and other caffeine-free clear liquids until you feel better. If you have kidney, heart, or liver disease and have to limit fluids, talk with your doctor before you increase the amount of fluids you drink. · Rest in bed until you feel better. · When you are able to eat, try clear soups, mild foods, and liquids until all symptoms are gone for 12 to 48 hours. Other good choices include dry toast, crackers, cooked cereal, and gelatin dessert, such as Jell-O. When should you call for help? Call 911 anytime you think you may need emergency care. For example, call if:    · You passed out (lost consciousness).    Call your doctor now or seek immediate medical care if:    · You have symptoms of dehydration, such as:  ? Dry eyes and a dry mouth. ? Passing only a little dark urine. ?  Feeling thirstier than usual.   · You have new or worsening belly pain.     · You have a new or higher fever.     · You vomit blood or what looks like coffee grounds.    Watch closely for changes in your health, and be sure to contact your doctor if:    · You have ongoing nausea and vomiting.     · Your vomiting is getting worse.     · Your vomiting lasts longer than 2 days.     · You are not getting better as expected. Where can you learn more? Go to http://briana-bran.info/. Enter [id number] in the search box to learn more about \"Nausea and Vomiting: Care Instructions. \"  Current as of: June 26, 2019  Content Version: 12.2  © 6545-8842 [employer]. Care instructions adapted under license by [employer] (which disclaims liability or warranty for this information). If you have questions about a medical condition or this instruction, always ask your healthcare professional. [address] any warranty or liability for your use of this information.

## 2019-12-26 ENCOUNTER — HOSPITAL ENCOUNTER (EMERGENCY)
Age: 39
Discharge: HOME | End: 2019-12-26
Attending: EMERGENCY MEDICINE
Payer: SELF-PAY

## 2019-12-26 VITALS
OXYGEN SATURATION: 98 % | SYSTOLIC BLOOD PRESSURE: 122 MMHG | TEMPERATURE: 98.4 F | HEIGHT: 66 IN | WEIGHT: 110.23 LBS | RESPIRATION RATE: 14 BRPM | DIASTOLIC BLOOD PRESSURE: 85 MMHG | HEART RATE: 73 BPM | BODY MASS INDEX: 17.72 KG/M2

## 2019-12-26 DIAGNOSIS — E87.6 HYPOKALEMIA: ICD-10-CM

## 2019-12-26 DIAGNOSIS — E83.42 HYPOMAGNESEMIA: ICD-10-CM

## 2019-12-26 DIAGNOSIS — R19.7 VOMITING AND DIARRHEA: ICD-10-CM

## 2019-12-26 DIAGNOSIS — R11.10 VOMITING AND DIARRHEA: ICD-10-CM

## 2019-12-26 DIAGNOSIS — E86.0 DEHYDRATION: Primary | ICD-10-CM

## 2019-12-26 LAB
ALBUMIN SERPL-MCNC: 4.8 G/DL (ref 3.5–5)
ALBUMIN/GLOB SERPL: 1.3 {RATIO} (ref 1.1–2.2)
ALP SERPL-CCNC: 72 U/L (ref 45–117)
ALT SERPL-CCNC: 23 U/L (ref 12–78)
ANION GAP SERPL CALC-SCNC: 20 MMOL/L (ref 5–15)
APPEARANCE UR: CLEAR
ARTERIAL PATENCY WRIST A: ABNORMAL
AST SERPL-CCNC: 18 U/L (ref 15–37)
BACTERIA URNS QL MICRO: NEGATIVE /HPF
BASE DEFICIT BLDV-SCNC: 3 MMOL/L
BASOPHILS # BLD: 0.1 K/UL (ref 0–0.1)
BASOPHILS NFR BLD: 1 % (ref 0–1)
BDY SITE: ABNORMAL
BILIRUB SERPL-MCNC: 1 MG/DL (ref 0.2–1)
BILIRUB UR QL: NEGATIVE
BUN SERPL-MCNC: 23 MG/DL (ref 6–20)
BUN/CREAT SERPL: 21 (ref 12–20)
CALCIUM SERPL-MCNC: 10.3 MG/DL (ref 8.5–10.1)
CHLORIDE SERPL-SCNC: 97 MMOL/L (ref 97–108)
CO2 SERPL-SCNC: 22 MMOL/L (ref 21–32)
COLOR UR: ABNORMAL
COMMENT, HOLDF: NORMAL
CREAT SERPL-MCNC: 1.08 MG/DL (ref 0.55–1.02)
DIFFERENTIAL METHOD BLD: ABNORMAL
EOSINOPHIL # BLD: 0 K/UL (ref 0–0.4)
EOSINOPHIL NFR BLD: 0 % (ref 0–7)
EPITH CASTS URNS QL MICRO: ABNORMAL /LPF
ERYTHROCYTE [DISTWIDTH] IN BLOOD BY AUTOMATED COUNT: 12.2 % (ref 11.5–14.5)
GAS FLOW.O2 O2 DELIVERY SYS: ABNORMAL L/MIN
GLOBULIN SER CALC-MCNC: 3.8 G/DL (ref 2–4)
GLUCOSE SERPL-MCNC: 258 MG/DL (ref 65–100)
GLUCOSE UR STRIP.AUTO-MCNC: 500 MG/DL
HCG SERPL QL: NEGATIVE
HCO3 BLDV-SCNC: 20.9 MMOL/L (ref 23–28)
HCT VFR BLD AUTO: 45.9 % (ref 35–47)
HGB BLD-MCNC: 15.8 G/DL (ref 11.5–16)
HGB UR QL STRIP: ABNORMAL
IMM GRANULOCYTES # BLD AUTO: 0 K/UL (ref 0–0.04)
IMM GRANULOCYTES NFR BLD AUTO: 0 % (ref 0–0.5)
KETONES UR QL STRIP.AUTO: >80 MG/DL
LEUKOCYTE ESTERASE UR QL STRIP.AUTO: NEGATIVE
LIPASE SERPL-CCNC: 66 U/L (ref 73–393)
LYMPHOCYTES # BLD: 0.7 K/UL (ref 0.8–3.5)
LYMPHOCYTES NFR BLD: 12 % (ref 12–49)
MAGNESIUM SERPL-MCNC: 1.4 MG/DL (ref 1.6–2.4)
MCH RBC QN AUTO: 28 PG (ref 26–34)
MCHC RBC AUTO-ENTMCNC: 34.4 G/DL (ref 30–36.5)
MCV RBC AUTO: 81.4 FL (ref 80–99)
MONOCYTES # BLD: 0.2 K/UL (ref 0–1)
MONOCYTES NFR BLD: 3 % (ref 5–13)
MUCOUS THREADS URNS QL MICRO: ABNORMAL /LPF
NEUTS SEG # BLD: 4.8 K/UL (ref 1.8–8)
NEUTS SEG NFR BLD: 84 % (ref 32–75)
NITRITE UR QL STRIP.AUTO: NEGATIVE
NRBC # BLD: 0 K/UL (ref 0–0.01)
NRBC BLD-RTO: 0 PER 100 WBC
PCO2 BLDV: 29.5 MMHG (ref 41–51)
PH BLDV: 7.46 [PH] (ref 7.32–7.42)
PH UR STRIP: 6.5 [PH] (ref 5–8)
PLATELET # BLD AUTO: 278 K/UL (ref 150–400)
PMV BLD AUTO: 10.9 FL (ref 8.9–12.9)
PO2 BLDV: 28 MMHG (ref 25–40)
POTASSIUM SERPL-SCNC: 2.9 MMOL/L (ref 3.5–5.1)
PROT SERPL-MCNC: 8.6 G/DL (ref 6.4–8.2)
PROT UR STRIP-MCNC: ABNORMAL MG/DL
RBC # BLD AUTO: 5.64 M/UL (ref 3.8–5.2)
RBC #/AREA URNS HPF: ABNORMAL /HPF (ref 0–5)
RBC MORPH BLD: ABNORMAL
SAMPLES BEING HELD,HOLD: NORMAL
SAO2 % BLDV: 59 % (ref 65–88)
SODIUM SERPL-SCNC: 139 MMOL/L (ref 136–145)
SP GR UR REFRACTOMETRY: 1.02 (ref 1–1.03)
SPECIMEN TYPE: ABNORMAL
TOTAL RESP. RATE, ITRR: 16
TROPONIN I SERPL-MCNC: <0.05 NG/ML
UR CULT HOLD, URHOLD: NORMAL
UROBILINOGEN UR QL STRIP.AUTO: 0.2 EU/DL (ref 0.2–1)
WBC # BLD AUTO: 5.8 K/UL (ref 3.6–11)
WBC URNS QL MICRO: ABNORMAL /HPF (ref 0–4)

## 2019-12-26 PROCEDURE — 36415 COLL VENOUS BLD VENIPUNCTURE: CPT

## 2019-12-26 PROCEDURE — 85025 COMPLETE CBC W/AUTO DIFF WBC: CPT

## 2019-12-26 PROCEDURE — 84484 ASSAY OF TROPONIN QUANT: CPT

## 2019-12-26 PROCEDURE — 84703 CHORIONIC GONADOTROPIN ASSAY: CPT

## 2019-12-26 PROCEDURE — 80053 COMPREHEN METABOLIC PANEL: CPT

## 2019-12-26 PROCEDURE — 83690 ASSAY OF LIPASE: CPT

## 2019-12-26 PROCEDURE — 96360 HYDRATION IV INFUSION INIT: CPT

## 2019-12-26 PROCEDURE — 74011000250 HC RX REV CODE- 250: Performed by: EMERGENCY MEDICINE

## 2019-12-26 PROCEDURE — 93005 ELECTROCARDIOGRAM TRACING: CPT

## 2019-12-26 PROCEDURE — 74011250636 HC RX REV CODE- 250/636: Performed by: EMERGENCY MEDICINE

## 2019-12-26 PROCEDURE — 96375 TX/PRO/DX INJ NEW DRUG ADDON: CPT

## 2019-12-26 PROCEDURE — 74011250637 HC RX REV CODE- 250/637: Performed by: EMERGENCY MEDICINE

## 2019-12-26 PROCEDURE — 82803 BLOOD GASES ANY COMBINATION: CPT

## 2019-12-26 PROCEDURE — 99285 EMERGENCY DEPT VISIT HI MDM: CPT

## 2019-12-26 PROCEDURE — 81001 URINALYSIS AUTO W/SCOPE: CPT

## 2019-12-26 PROCEDURE — 96361 HYDRATE IV INFUSION ADD-ON: CPT

## 2019-12-26 PROCEDURE — 83735 ASSAY OF MAGNESIUM: CPT

## 2019-12-26 RX ORDER — DIPHENHYDRAMINE HYDROCHLORIDE 50 MG/ML
50 INJECTION, SOLUTION INTRAMUSCULAR; INTRAVENOUS
Status: DISCONTINUED | OUTPATIENT
Start: 2019-12-26 | End: 2019-12-26 | Stop reason: HOSPADM

## 2019-12-26 RX ORDER — PROMETHAZINE HYDROCHLORIDE 25 MG/1
25 TABLET ORAL
Status: COMPLETED | OUTPATIENT
Start: 2019-12-26 | End: 2019-12-26

## 2019-12-26 RX ORDER — POTASSIUM CHLORIDE 750 MG/1
60 TABLET, FILM COATED, EXTENDED RELEASE ORAL
Status: COMPLETED | OUTPATIENT
Start: 2019-12-26 | End: 2019-12-26

## 2019-12-26 RX ORDER — PROCHLORPERAZINE EDISYLATE 5 MG/ML
10 INJECTION INTRAMUSCULAR; INTRAVENOUS
Status: DISCONTINUED | OUTPATIENT
Start: 2019-12-26 | End: 2019-12-26 | Stop reason: SDUPTHER

## 2019-12-26 RX ORDER — MAGNESIUM SULFATE HEPTAHYDRATE 40 MG/ML
2 INJECTION, SOLUTION INTRAVENOUS
Status: COMPLETED | OUTPATIENT
Start: 2019-12-26 | End: 2019-12-26

## 2019-12-26 RX ORDER — POTASSIUM CHLORIDE 1500 MG/1
20 TABLET, FILM COATED, EXTENDED RELEASE ORAL 2 TIMES DAILY
Qty: 14 TAB | Refills: 0 | Status: SHIPPED | OUTPATIENT
Start: 2019-12-26 | End: 2022-03-02

## 2019-12-26 RX ORDER — PROMETHAZINE HYDROCHLORIDE 25 MG/1
25 TABLET ORAL
Qty: 12 TAB | Refills: 0 | Status: SHIPPED | OUTPATIENT
Start: 2019-12-26 | End: 2022-03-02

## 2019-12-26 RX ORDER — DULOXETIN HYDROCHLORIDE 60 MG/1
60 CAPSULE, DELAYED RELEASE ORAL DAILY
COMMUNITY

## 2019-12-26 RX ORDER — INSULIN GLARGINE 100 [IU]/ML
12 INJECTION, SOLUTION SUBCUTANEOUS
COMMUNITY
End: 2022-03-04

## 2019-12-26 RX ADMIN — SODIUM CHLORIDE 10 MG: 9 INJECTION INTRAMUSCULAR; INTRAVENOUS; SUBCUTANEOUS at 17:07

## 2019-12-26 RX ADMIN — PROMETHAZINE HYDROCHLORIDE 25 MG: 25 TABLET ORAL at 19:21

## 2019-12-26 RX ADMIN — POTASSIUM CHLORIDE 60 MEQ: 750 TABLET, FILM COATED, EXTENDED RELEASE ORAL at 17:55

## 2019-12-26 RX ADMIN — MAGNESIUM SULFATE HEPTAHYDRATE 2 G: 40 INJECTION, SOLUTION INTRAVENOUS at 17:55

## 2019-12-26 RX ADMIN — SODIUM CHLORIDE 1000 ML: 900 INJECTION, SOLUTION INTRAVENOUS at 17:07

## 2019-12-26 RX ADMIN — MAGNESIUM SULFATE HEPTAHYDRATE 2 G: 40 INJECTION, SOLUTION INTRAVENOUS at 19:02

## 2019-12-26 NOTE — ED TRIAGE NOTES
Triage: pt c/o N/V x3 days. Pt was seen here by Dr. Rodriguez Pettit for same and given phenergan but does not have any more of the medication. Pt does not have a GI. Recently dx w/diabetes x7 months ago.

## 2019-12-26 NOTE — ED PROVIDER NOTES
This patient is a type I diabetic who presents with vomiting. She has had multiple episodes of vomiting for the past 3 days. She tried some Phenergan without much relief. This was left over from a visit a little over 2 months ago here. She has upper abdominal pain. No polyuria or polydipsia. Blood sugar was 270 about 90 minutes ago. She covered her self with Humalog. She also takes Lantus once a day. Last dose was yesterday at 7 PM.  This is almost 22 hours ago. No fever. She did take antibiotics for an oral infection 3 weeks ago. She also has had 3 or 4 episodes of watery diarrhea. She has a compost and toilet so she is not sure if there is blood in it. Her son had a couple episodes of vomiting recently but he resolved. 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 She certainly could have been exposed to an illness from them as well. She has severe nausea. Old chart reviewed: Last ED visit was October 16. That visit was for vomiting. She was discharged home.             Past Medical History:   Diagnosis Date    Diabetes ([address]     Type 1 diabetes (HCC)        Past Surgical History:   Procedure Laterality Date    HX ACL RECONSTRUCTION           Family History:   Problem Relation Age of Onset   [address] [facility] Arthritis-rheumatoid Mother     Asthma Mother     Thyroid Disease Mother     Cancer Father     Lung Cancer Father        Social History     Socioeconomic History    Marital status: UNKNOWN     Spouse name: Not on file    Number of children: Not on file    Years of education: Not on file    Highest education level: Not on file   Occupational History    Not on file   Social Needs    Financial resource strain: Not on file    Food insecurity:     Worry: Not on file     Inability: Not on file    Transportation needs:     Medical: Not on file     Non-medical: Not on file   Tobacco Use    Smoking status: Never Smoker    Smokeless tobacco: Current User    Tobacco comment: Vape   Substance and Sexual Activity    Alcohol use: Never     Frequency: Never    Drug use: Not Currently    Sexual activity: Not on file   Lifestyle    Physical activity:     Days per week: Not on file     Minutes per session: Not on file    Stress: Not on file   Relationships    Social connections:     Talks on phone: Not on file     Gets together: Not on file     Attends Jain service: Not on file     Active member of club or organization: Not on file     Attends meetings of clubs or organizations: Not on file     Relationship status: Not on file    Intimate partner violence:     Fear of current or ex partner: Not on file     Emotionally abused: Not on file     Physically abused: Not on file     Forced sexual activity: Not on file   Other Topics Concern    Not on file   Social History Narrative    Not on file         ALLERGIES: Patient has no known allergies. Review of Systems   All other systems reviewed and are negative. Vitals:    12/26/19 1632   BP: 129/89   Pulse: (!) 104   Resp: 16   SpO2: 100%   Weight: 50 kg (110 lb 3.7 oz)   Height: 5' 5.5\" (1.664 m)            Physical Exam  Constitutional:       Comments: Appears not to feel well. Thin appearing   HENT:      Head: Normocephalic. Nose: Nose normal.      Mouth/Throat:      Mouth: Mucous membranes are moist.   Eyes:      Pupils: Pupils are equal, round, and reactive to light. Neck:      Comments: Trachea midline  Cardiovascular:      Rate and Rhythm: Regular rhythm. Tachycardia present. Pulmonary:      Comments: Seems tachypneic. Lungs are clear. Abdominal:      General: Abdomen is flat. Palpations: Abdomen is soft. Tenderness: There is tenderness (Mild upper). Musculoskeletal:         General: No swelling. Skin:     General: Skin is warm and dry. Capillary Refill: Capillary refill takes less than 2 seconds. Neurological:      General: No focal deficit present. Mental Status: She is alert. [facility]       Procedures        EKG interpretation by me: Normal sinus rhythm, regular, rate of 88, nonspecific inferolateral ST-T wave changes. Prolonged QTC. Normal axis. This is quite different when compared to the last EKG from 2019. Omelia Room, DO       first vb.71/<17. Rest was error. 5:47 PM - pH is 7.46/-3      7 PM: Patient is tolerating p.o. No vomiting. She took 60 mEq of potassium here. She got 2 rounds of 2 g magnesium sulfate each. We will put her on potassium supplementation. She feels well enough to go home soon. EKG abnormalities were likely due to low mag and low potassium. She will see her endocrinologist at [facility], Dr. Junior Phillips, for follow-up. Recent Results (from the past 12 hour(s))   CBC WITH AUTOMATED DIFF    Collection Time: 19  4:55 PM   Result Value Ref Range    WBC 5.8 3.6 - 11.0 K/uL    RBC 5.64 (H) 3.80 - 5.20 M/uL    HGB 15.8 11.5 - 16.0 g/dL    HCT 45.9 35.0 - 47.0 %    MCV 81.4 80.0 - 99.0 FL    MCH 28.0 26.0 - 34.0 PG    MCHC 34.4 30.0 - 36.5 g/dL    RDW 12.2 11.5 - 14.5 %    PLATELET 502 698 - 049 K/uL    MPV 10.9 8.9 - 12.9 FL    NRBC 0.0 0  WBC    ABSOLUTE NRBC 0.00 0.00 - 0.01 K/uL    NEUTROPHILS 84 (H) 32 - 75 %    LYMPHOCYTES 12 12 - 49 %    MONOCYTES 3 (L) 5 - 13 %    EOSINOPHILS 0 0 - 7 %    BASOPHILS 1 0 - 1 %    IMMATURE GRANULOCYTES 0 0.0 - 0.5 %    ABS. NEUTROPHILS 4.8 1.8 - 8.0 K/UL    ABS. LYMPHOCYTES 0.7 (L) 0.8 - 3.5 K/UL    ABS. MONOCYTES 0.2 0.0 - 1.0 K/UL    ABS. EOSINOPHILS 0.0 0.0 - 0.4 K/UL    ABS. BASOPHILS 0.1 0.0 - 0.1 K/UL    ABS. IMM.  GRANS. 0.0 0.00 - 0.04 K/UL    DF SMEAR SCANNED      RBC COMMENTS NORMOCYTIC, NORMOCHROMIC     METABOLIC PANEL, COMPREHENSIVE    Collection Time: 19  4:55 PM   Result Value Ref Range    Sodium 139 136 - 145 mmol/L    Potassium 2.9 (L) 3.5 - 5.1 mmol/L    Chloride 97 97 - 108 mmol/L    CO2 22 21 - 32 mmol/L    Anion gap 20 (H) 5 - 15 mmol/L    Glucose 258 (H) 65 - 100 mg/dL    BUN 23 (H) 6 - 20 MG/DL    Creatinine 1.08 (H) 0.55 - 1.02 MG/DL    BUN/Creatinine ratio 21 (H) 12 - 20      GFR est AA >60 >60 ml/min/1.73m2    GFR est non-AA 57 (L) >60 ml/min/1.73m2    Calcium 10.3 (H) 8.5 - 10.1 MG/DL    Bilirubin, total 1.0 0.2 - 1.0 MG/DL    ALT (SGPT) 23 12 - 78 U/L    AST (SGOT) 18 15 - 37 U/L    Alk. phosphatase 72 45 - 117 U/L    Protein, total 8.6 (H) 6.4 - 8.2 g/dL    Albumin 4.8 3.5 - 5.0 g/dL    Globulin 3.8 2.0 - 4.0 g/dL    A-G Ratio 1.3 1.1 - 2.2     MAGNESIUM    Collection Time: 12/26/19  4:55 PM   Result Value Ref Range    Magnesium 1.4 (L) 1.6 - 2.4 mg/dL   LIPASE    Collection Time: 12/26/19  4:55 PM   Result Value Ref Range    Lipase 66 (L) 73 - 393 U/L   SAMPLES BEING HELD    Collection Time: 12/26/19  4:55 PM   Result Value Ref Range    SAMPLES BEING HELD 1RED 1BLUE     COMMENT        Add-on orders for these samples will be processed based on acceptable specimen integrity and analyte stability, which may vary by analyte.    HCG QL SERUM    Collection Time: 12/26/19  4:55 PM   Result Value Ref Range    HCG, Ql. NEGATIVE  NEG     TROPONIN I    Collection Time: 12/26/19  4:55 PM   Result Value Ref Range    Troponin-I, Qt. <0.05 <0.05 ng/mL   EKG, 12 LEAD, INITIAL    Collection Time: 12/26/19  5:03 PM   Result Value Ref Range    Ventricular Rate 88 BPM    Atrial Rate 88 BPM    P-R Interval 152 ms    QRS Duration 84 ms    Q-T Interval 416 ms    QTC Calculation (Bezet) 503 ms    Calculated P Axis -25 degrees    Calculated R Axis 23 degrees    Calculated T Axis -61 degrees    Diagnosis       Normal sinus rhythm  ST & T wave abnormality, consider inferolateral ischemia  Prolonged QT  Abnormal ECG  When compared with ECG of 16-OCT-2019 09:47,  T wave inversion more evident in Inferior leads  T wave inversion now evident in Anterolateral leads  QT has lengthened     URINALYSIS W/MICROSCOPIC    Collection Time: 12/26/19  6:26 PM   Result Value Ref Range    Color YELLOW/STRAW      Appearance CLEAR CLEAR      Specific gravity 1.020 1.003 - 1.030      pH (UA) 6.5 5.0 - 8.0      Protein TRACE (A) NEG mg/dL    Glucose 500 (A) NEG mg/dL    Ketone >80 (A) NEG mg/dL    Bilirubin NEGATIVE  NEG      Blood SMALL (A) NEG      Urobilinogen 0.2 0.2 - 1.0 EU/dL    Nitrites NEGATIVE  NEG      Leukocyte Esterase NEGATIVE  NEG      WBC 5-10 0 - 4 /hpf    RBC 0-5 0 - 5 /hpf    Epithelial cells MODERATE (A) FEW /lpf    Bacteria NEGATIVE  NEG /hpf    Mucus 2+ (A) NEG /lpf   URINE CULTURE HOLD SAMPLE    Collection Time: 12/26/19  6:26 PM   Result Value Ref Range    Urine culture hold        URINE ON HOLD IN MICROBIOLOGY DEPT FOR 3 DAYS. IF UNPRESERVED URINE IS SUBMITTED, IT CANNOT BE USED FOR ADDITIONAL TESTING AFTER 24 HRS, RECOLLECTION WILL BE REQUIRED.

## 2019-12-27 NOTE — ED NOTES
Discharge instructions reviewed with pt and copy given along with RX by this RN. Pt educated on follow up with Internal Medicine MD and educated on hypokalemia regarding signs and symptoms to watch for at home as well as dietary adjustments to make at home. Pt also educated on discharge R(x). Pt verbalized understanding of all education and is ambulatory from ED accompanied by self in no sign of distress or discomfort.

## 2019-12-27 NOTE — DISCHARGE INSTRUCTIONS
Patient Education   Patient Education   Patient Education   Patient Education        Gastroenteritis: Care Instructions  Your Care Instructions    Gastroenteritis is an illness that may cause nausea, vomiting, and diarrhea. It is sometimes called \"stomach flu. \" It can be caused by bacteria or a virus. You will probably begin to feel better in 1 to 2 days. In the meantime, get plenty of rest and make sure you do not become dehydrated. Dehydration occurs when your body loses too much fluid. Follow-up care is a key part of your treatment and safety. Be sure to make and go to all appointments, and call your doctor if you are having problems. It's also a good idea to know your test results and keep a list of the medicines you take. How can you care for yourself at home? · If your doctor prescribed antibiotics, take them as directed. Do not stop taking them just because you feel better. You need to take the full course of antibiotics. · Drink plenty of fluids to prevent dehydration, enough so that your urine is light yellow or clear like water. Choose water and other caffeine-free clear liquids until you feel better. If you have kidney, heart, or liver disease and have to limit fluids, talk with your doctor before you increase your fluid intake. · Drink fluids slowly, in frequent, small amounts, because drinking too much too fast can cause vomiting. · Begin eating mild foods, such as dry toast, yogurt, applesauce, bananas, and rice. Avoid spicy, hot, or high-fat foods, and do not drink alcohol or caffeine for a day or two. Do not drink milk or eat ice cream until you are feeling better. How to prevent gastroenteritis  · Keep hot foods hot and cold foods cold. · Do not eat meats, dressings, salads, or other foods that have been kept at room temperature for more than 2 hours. · Use a thermometer to check your refrigerator.  It should be between 34°F and 40°F.  · Defrost meats in the refrigerator or microwave, not on the kitchen counter. · Keep your hands and your kitchen clean. Wash your hands, cutting boards, and countertops with hot soapy water frequently. · Cook meat until it is well done. · Do not eat raw eggs or uncooked sauces made with raw eggs. · Do not take chances. If food looks or tastes spoiled, throw it out. When should you call for help? Call 911 anytime you think you may need emergency care. For example, call if:    · You vomit blood or what looks like coffee grounds.     · You passed out (lost consciousness).     · You pass maroon or very bloody stools.    Call your doctor now or seek immediate medical care if:    · You have severe belly pain.     · You have signs of needing more fluids. You have sunken eyes, a dry mouth, and pass only a little dark urine.     · You feel like you are going to faint.     · You have increased belly pain that does not go away in 1 to 2 days.     · You have new or increased nausea, or you are vomiting.     · You have a new or higher fever.     · Your stools are black and tarlike or have streaks of blood.    Watch closely for changes in your health, and be sure to contact your doctor if:    · You are dizzy or lightheaded.     · You urinate less than usual, or your urine is dark yellow or brown.     · You do not feel better with each day that goes by. Where can you learn more? Go to http://briana-bran.info/. Enter N142 in the search box to learn more about \"Gastroenteritis: Care Instructions. \"  Current as of: June 9, 2019  Content Version: 12.2  © 2207-4643 "[employer]. Care instructions adapted under license by [employer] (which disclaims liability or warranty for this information). If you have questions about a medical condition or this instruction, always ask your healthcare professional. [address] any warranty or liability for your use of this information.             Hypokalemia: Care Instructions  Your Care Instructions    Hypokalemia (say \"sn-xa-bjk-GILLIAN-valentina-uh\") is a low level of potassium. The heart, muscles, kidneys, and nervous system all need potassium to work well. This problem has many different causes. Kidney problems, diet, and medicines like diuretics and laxatives can cause it. So can vomiting or diarrhea. In some cases, cancer is the cause. Your doctor may do tests to find the cause of your low potassium levels. You may need medicines to bring your potassium levels back to normal. You may also need regular blood tests to check your potassium. If you have very low potassium, you may need intravenous (IV) medicines. You also may need tests to check the electrical activity of your heart. Heart problems caused by low potassium levels can be very serious. Follow-up care is a key part of your treatment and safety. Be sure to make and go to all appointments, and call your doctor if you are having problems. It's also a good idea to know your test results and keep a list of the medicines you take. How can you care for yourself at home? · If your doctor recommends it, eat foods that have a lot of potassium. These include fresh fruits, juices, and vegetables. They also include nuts, beans, and milk. · Be safe with medicines. If your doctor prescribes medicines or potassium supplements, take them exactly as directed. Call your doctor if you have any problems with your medicines. · Get your potassium levels tested as often as your doctor tells you. When should you call for help? Call 911 anytime you think you may need emergency care. For example, call if:    · You feel like your heart is missing beats.  Heart problems caused by low potassium can cause death.     · You passed out (lost consciousness).     · You have a seizure.    Call your doctor now or seek immediate medical care if:    · You feel weak or unusually tired.     · You have severe arm or leg cramps.     · You have tingling or numbness.     · You feel sick to your stomach, or you vomit.     · You have belly cramps.     · You feel bloated or constipated.     · You have to urinate a lot.     · You feel very thirsty most of the time.     · You are dizzy or lightheaded, or you feel like you may faint.     · You feel depressed, or you lose touch with reality.    Watch closely for changes in your health, and be sure to contact your doctor if:    · You do not get better as expected. Where can you learn more? Go to http://briana-bran.info/. Enter G358 in the search box to learn more about \"Hypokalemia: Care Instructions. \"  Current as of: November 6, 2018  Content Version: 12.2  © 3603-9467 [employer]. Care instructions adapted under license by [employer] (which disclaims liability or warranty for this information). If you have questions about a medical condition or this instruction, always ask your healthcare professional. [address] any warranty or liability for your use of this information. Potassium-Rich Diet: Care Instructions  Your Care Instructions    Potassium is a mineral. It helps keep the right mix of fluids in your body. It also helps your nerves and muscles work as they should. You'll find it in milk and meats. It's also in all fresh foods, including fruits and vegetables. Most adults need about 5 grams of potassium a day. The foods you eat should supply all that you need. Some health conditions can cause a loss of potassium. For example, kidney problems and stomach problems with vomiting and diarrhea can cause you to lose this mineral. Some medicines, such as water pills (diuretics), can cause low potassium. If you can't get enough potassium from what you eat, your doctor may advise you to take supplements. Follow-up care is a key part of your treatment and safety.  Be sure to make and go to all appointments, and call your doctor if you are having problems. It's also a good idea to know your test results and keep a list of the medicines you take. How can you care for yourself at home? · Plan your diet around foods that are rich in potassium. Fresh, unprocessed whole foods have the most. These foods include:  ? Milk and other dairy products. ? Vegetables, especially broccoli, cooked dry beans, tomatoes, potatoes, artichokes, winter squash, and spinach. ? Fruits, especially citrus fruits, bananas, and apricots. Dried apricots contain more potassium than fresh apricots. ? Meat, poultry, and fish. · Ask your doctor about using a salt substitute or \"light\" salt. These often contain potassium. Where can you learn more? Go to http://briana-bran.info/. Enter H315 in the search box to learn more about \"Potassium-Rich Diet: Care Instructions. \"  Current as of: November 7, 2018  Content Version: 12.2  © 0058-1392 [employer]. Care instructions adapted under license by [employer] (which disclaims liability or warranty for this information). If you have questions about a medical condition or this instruction, always ask your healthcare professional. [place] [address] any warranty or liability for your use of this information. Oral Rehydration: Care Instructions  Your Care Instructions    Dehydration occurs when your body loses too much water. This can happen if you do not drink enough fluids or lose a lot of fluid due to diarrhea, vomiting, or sweating. Being dehydrated can cause health problems and can even be life-threatening. To replace lost fluids, you need to drink liquid that contains special chemicals called electrolytes. Electrolytes keep your body working well. Plain water does not have electrolytes. You also need to rest to prevent more fluid loss. Replacing water and electrolytes (oral rehydration) completely takes about 36 hours.  But you should feel better within a few hours.  Follow-up care is a key part of your treatment and safety. Be sure to make and go to all appointments, and call your doctor if you are having problems. It's also a good idea to know your test results and keep a list of the medicines you take. How can you care for yourself at home? · Take frequent sips of a drink such as Gatorade, Powerade, or other rehydration drinks that your doctor suggests. These replace both fluid and important chemicals (electrolytes) you need for balance in your blood. · Drink 2 quarts of cool liquid over 2 to 4 hours. You should have at least 10 glasses of liquid a day to replace lost fluid. If you have kidney, heart, or liver disease and have to limit fluids, talk with your doctor before you increase the amount of fluids you drink. · Make your own drink. Measure everything carefully. The drink may not work well or may even be harmful if the amounts are off. ? 1 quart water  ? ½ teaspoon salt  ? 6 teaspoons sugar  · Do not drink liquid with caffeine, such as coffee and shadi. · Do not drink any alcohol. It can make you dehydrated. · Drink plenty of fluids, enough so that your urine is light yellow or clear like water. If you have kidney, heart, or liver disease and have to limit fluids, talk with your doctor before you increase the amount of fluids you drink. When should you call for help? Call 911 anytime you think you may need emergency care. For example, call if:    · You have signs of severe dehydration, such as:  ? You are confused or unable to stay awake.  ? You passed out (lost consciousness).    Call your doctor now or seek immediate medical care if:    · You still have signs of dehydration.  You have sunken eyes and a dry mouth, and you pass only a little dark urine.     · You are dizzy or lightheaded, or you feel like you may faint.     · You are not able to keep down fluids.    Watch closely for changes in your health, and be sure to contact your doctor if:    · You do not get better as expected. Where can you learn more? Go to http://briana-bran.info/. Enter I040 in the search box to learn more about \"Oral Rehydration: Care Instructions. \"  Current as of: June 26, 2019  Content Version: 12.2  © 1108-7290 [employer]. Care instructions adapted under license by [employer] (which disclaims liability or warranty for this information). If you have questions about a medical condition or this instruction, always ask your healthcare professional. [address] any warranty or liability for your use of this information.

## 2019-12-28 LAB
ATRIAL RATE: 88 BPM
CALCULATED P AXIS, ECG09: -25 DEGREES
CALCULATED R AXIS, ECG10: 23 DEGREES
CALCULATED T AXIS, ECG11: -61 DEGREES
DIAGNOSIS, 93000: NORMAL
P-R INTERVAL, ECG05: 152 MS
Q-T INTERVAL, ECG07: 416 MS
QRS DURATION, ECG06: 84 MS
QTC CALCULATION (BEZET), ECG08: 503 MS
VENTRICULAR RATE, ECG03: 88 BPM

## 2022-03-02 ENCOUNTER — APPOINTMENT (OUTPATIENT)
Dept: GENERAL RADIOLOGY | Age: 42
DRG: 639 | End: 2022-03-02
Attending: STUDENT IN AN ORGANIZED HEALTH CARE EDUCATION/TRAINING PROGRAM
Payer: COMMERCIAL

## 2022-03-02 ENCOUNTER — HOSPITAL ENCOUNTER (INPATIENT)
Age: 42
LOS: 2 days | Discharge: HOME | DRG: 639 | End: 2022-03-04
Attending: STUDENT IN AN ORGANIZED HEALTH CARE EDUCATION/TRAINING PROGRAM | Admitting: FAMILY MEDICINE
Payer: COMMERCIAL

## 2022-03-02 DIAGNOSIS — R11.10 VOMITING AND DIARRHEA: ICD-10-CM

## 2022-03-02 DIAGNOSIS — R19.7 VOMITING AND DIARRHEA: ICD-10-CM

## 2022-03-02 DIAGNOSIS — E10.10 TYPE 1 DIABETES MELLITUS WITH KETOACIDOSIS WITHOUT COMA (HCC): Primary | ICD-10-CM

## 2022-03-02 PROBLEM — R73.9 HYPERGLYCEMIA: Status: ACTIVE | Noted: 2022-03-02

## 2022-03-02 LAB
ALBUMIN SERPL-MCNC: 4.9 G/DL (ref 3.5–5)
ALBUMIN/GLOB SERPL: 1.3 {RATIO} (ref 1.1–2.2)
ALP SERPL-CCNC: 60 U/L (ref 45–117)
ALT SERPL-CCNC: 24 U/L (ref 12–78)
ANION GAP SERPL CALC-SCNC: 17 MMOL/L (ref 5–15)
APPEARANCE UR: CLEAR
AST SERPL-CCNC: 28 U/L (ref 15–37)
ATRIAL RATE: 441 BPM
BACTERIA URNS QL MICRO: NEGATIVE /HPF
BASE DEFICIT BLDV-SCNC: 5.8 MMOL/L
BASOPHILS # BLD: 0.1 K/UL (ref 0–0.1)
BASOPHILS NFR BLD: 1 % (ref 0–1)
BILIRUB SERPL-MCNC: 0.8 MG/DL (ref 0.2–1)
BILIRUB UR QL: NEGATIVE
BUN SERPL-MCNC: 18 MG/DL (ref 6–20)
BUN/CREAT SERPL: 22 (ref 12–20)
CALCIUM SERPL-MCNC: 10 MG/DL (ref 8.5–10.1)
CALCULATED R AXIS, ECG10: 55 DEGREES
CALCULATED T AXIS, ECG11: 112 DEGREES
CHLORIDE SERPL-SCNC: 99 MMOL/L (ref 97–108)
CO2 SERPL-SCNC: 21 MMOL/L (ref 21–32)
COLOR UR: ABNORMAL
COMMENT, HOLDF: NORMAL
CREAT SERPL-MCNC: 0.82 MG/DL (ref 0.55–1.02)
DIAGNOSIS, 93000: NORMAL
DIFFERENTIAL METHOD BLD: ABNORMAL
EOSINOPHIL # BLD: 0.1 K/UL (ref 0–0.4)
EOSINOPHIL NFR BLD: 1 % (ref 0–7)
EPITH CASTS URNS QL MICRO: ABNORMAL /LPF
ERYTHROCYTE [DISTWIDTH] IN BLOOD BY AUTOMATED COUNT: 13.3 % (ref 11.5–14.5)
GLOBULIN SER CALC-MCNC: 3.7 G/DL (ref 2–4)
GLUCOSE BLD STRIP.AUTO-MCNC: 315 MG/DL (ref 65–117)
GLUCOSE BLD STRIP.AUTO-MCNC: 339 MG/DL (ref 65–117)
GLUCOSE SERPL-MCNC: 397 MG/DL (ref 65–100)
GLUCOSE UR STRIP.AUTO-MCNC: >1000 MG/DL
HCG SERPL QL: NEGATIVE
HCO3 BLDV-SCNC: 14.8 MMOL/L (ref 23–28)
HCT VFR BLD AUTO: 40 % (ref 35–47)
HGB BLD-MCNC: 13.6 G/DL (ref 11.5–16)
HGB UR QL STRIP: ABNORMAL
IMM GRANULOCYTES # BLD AUTO: 0 K/UL (ref 0–0.04)
IMM GRANULOCYTES NFR BLD AUTO: 0 % (ref 0–0.5)
KETONES UR QL STRIP.AUTO: >80 MG/DL
LACTATE SERPL-SCNC: 2.6 MMOL/L (ref 0.4–2)
LEUKOCYTE ESTERASE UR QL STRIP.AUTO: NEGATIVE
LIPASE SERPL-CCNC: 81 U/L (ref 73–393)
LYMPHOCYTES # BLD: 0.8 K/UL (ref 0.8–3.5)
LYMPHOCYTES NFR BLD: 11 % (ref 12–49)
MAGNESIUM SERPL-MCNC: 1.6 MG/DL (ref 1.6–2.4)
MCH RBC QN AUTO: 29.6 PG (ref 26–34)
MCHC RBC AUTO-ENTMCNC: 34 G/DL (ref 30–36.5)
MCV RBC AUTO: 87.1 FL (ref 80–99)
MONOCYTES # BLD: 0.2 K/UL (ref 0–1)
MONOCYTES NFR BLD: 3 % (ref 5–13)
MUCOUS THREADS URNS QL MICRO: ABNORMAL /LPF
NEUTS SEG # BLD: 5.9 K/UL (ref 1.8–8)
NEUTS SEG NFR BLD: 84 % (ref 32–75)
NITRITE UR QL STRIP.AUTO: NEGATIVE
NRBC # BLD: 0 K/UL (ref 0–0.01)
NRBC BLD-RTO: 0 PER 100 WBC
PCO2 BLDV: 17.7 MMHG (ref 41–51)
PH BLDV: 7.53 [PH] (ref 7.32–7.42)
PH UR STRIP: 5.5 [PH] (ref 5–8)
PLATELET # BLD AUTO: 223 K/UL (ref 150–400)
PMV BLD AUTO: 10.9 FL (ref 8.9–12.9)
PO2 BLDV: 39 MMHG (ref 25–40)
POTASSIUM SERPL-SCNC: 3.8 MMOL/L (ref 3.5–5.1)
PROT SERPL-MCNC: 8.6 G/DL (ref 6.4–8.2)
PROT UR STRIP-MCNC: NEGATIVE MG/DL
Q-T INTERVAL, ECG07: 188 MS
QRS DURATION, ECG06: 82 MS
QTC CALCULATION (BEZET), ECG08: 216 MS
RBC # BLD AUTO: 4.59 M/UL (ref 3.8–5.2)
RBC #/AREA URNS HPF: ABNORMAL /HPF (ref 0–5)
RBC MORPH BLD: ABNORMAL
SAMPLES BEING HELD,HOLD: NORMAL
SAO2 % BLDV: 82.4 % (ref 65–88)
SERVICE CMNT-IMP: ABNORMAL
SODIUM SERPL-SCNC: 137 MMOL/L (ref 136–145)
SP GR UR REFRACTOMETRY: 1.02 (ref 1–1.03)
SPECIMEN TYPE: ABNORMAL
UR CULT HOLD, URHOLD: NORMAL
UROBILINOGEN UR QL STRIP.AUTO: 0.2 EU/DL (ref 0.2–1)
VENTRICULAR RATE, ECG03: 80 BPM
WBC # BLD AUTO: 7.1 K/UL (ref 3.6–11)
WBC URNS QL MICRO: ABNORMAL /HPF (ref 0–4)

## 2022-03-02 PROCEDURE — 71045 X-RAY EXAM CHEST 1 VIEW: CPT

## 2022-03-02 PROCEDURE — 96376 TX/PRO/DX INJ SAME DRUG ADON: CPT

## 2022-03-02 PROCEDURE — 84703 CHORIONIC GONADOTROPIN ASSAY: CPT

## 2022-03-02 PROCEDURE — 74011000250 HC RX REV CODE- 250: Performed by: STUDENT IN AN ORGANIZED HEALTH CARE EDUCATION/TRAINING PROGRAM

## 2022-03-02 PROCEDURE — 83735 ASSAY OF MAGNESIUM: CPT

## 2022-03-02 PROCEDURE — 96361 HYDRATE IV INFUSION ADD-ON: CPT

## 2022-03-02 PROCEDURE — 83605 ASSAY OF LACTIC ACID: CPT

## 2022-03-02 PROCEDURE — 74011250636 HC RX REV CODE- 250/636: Performed by: STUDENT IN AN ORGANIZED HEALTH CARE EDUCATION/TRAINING PROGRAM

## 2022-03-02 PROCEDURE — 81001 URINALYSIS AUTO W/SCOPE: CPT

## 2022-03-02 PROCEDURE — 96374 THER/PROPH/DIAG INJ IV PUSH: CPT

## 2022-03-02 PROCEDURE — 99285 EMERGENCY DEPT VISIT HI MDM: CPT

## 2022-03-02 PROCEDURE — 65660000000 HC RM CCU STEPDOWN

## 2022-03-02 PROCEDURE — 82803 BLOOD GASES ANY COMBINATION: CPT

## 2022-03-02 PROCEDURE — 36415 COLL VENOUS BLD VENIPUNCTURE: CPT

## 2022-03-02 PROCEDURE — 93005 ELECTROCARDIOGRAM TRACING: CPT

## 2022-03-02 PROCEDURE — 83690 ASSAY OF LIPASE: CPT

## 2022-03-02 PROCEDURE — 80053 COMPREHEN METABOLIC PANEL: CPT

## 2022-03-02 PROCEDURE — 85025 COMPLETE CBC W/AUTO DIFF WBC: CPT

## 2022-03-02 PROCEDURE — 82962 GLUCOSE BLOOD TEST: CPT

## 2022-03-02 RX ORDER — ONDANSETRON 2 MG/ML
4 INJECTION INTRAMUSCULAR; INTRAVENOUS
Status: DISCONTINUED | OUTPATIENT
Start: 2022-03-02 | End: 2022-03-02

## 2022-03-02 RX ADMIN — PROCHLORPERAZINE EDISYLATE 10 MG: 5 INJECTION INTRAMUSCULAR; INTRAVENOUS at 13:21

## 2022-03-02 RX ADMIN — SODIUM CHLORIDE, POTASSIUM CHLORIDE, SODIUM LACTATE AND CALCIUM CHLORIDE 1000 ML: 600; 310; 30; 20 INJECTION, SOLUTION INTRAVENOUS at 14:29

## 2022-03-02 RX ADMIN — SODIUM CHLORIDE 1000 ML: 9 INJECTION, SOLUTION INTRAVENOUS at 13:25

## 2022-03-02 RX ADMIN — PROCHLORPERAZINE EDISYLATE 10 MG: 5 INJECTION INTRAMUSCULAR; INTRAVENOUS at 15:19

## 2022-03-02 NOTE — ED TRIAGE NOTES
Patient arrives vis EMS with complaints of N/V since this am. History of type 1 DM.  for EMS     at this time.

## 2022-03-02 NOTE — ED PROVIDER NOTES
Chief Complaint   Patient presents with    Vomiting     History and review of systems limited due to altered sensorium. This is a 61-year-old female with type 1 diabetes presenting with vomiting and diarrhea that started abruptly today while she was at work. Her partner says that she was in her normal state of health yesterday and has been consistently using her short acting insulin but not using her long-acting insulin as it has historically caused her to become hypoglycemic. Accucheck here was 339. She is a limited historian but was able to respond to simple questions, denies bdominal pain, chest pain or shortness of breath. Feels tired.   Was noted to be hypothermic to 95.2 F on arrival.        Past Medical History:   Diagnosis Date    Diabetes ([facility] [address]     Type 1 diabetes (HCC)        Past Surgical History:   Procedure Laterality Date    HX ACL RECONSTRUCTION           Family History:   Problem Relation Age of Onset   [address] Arthritis-rheumatoid Mother     Asthma Mother     Thyroid Disease Mother     Cancer Father     Lung Cancer Father        Social History     Socioeconomic History    Marital status: UNKNOWN     Spouse name: Not on file    Number of children: Not on file    Years of education: Not on file    Highest education level: Not on file   Occupational History    Not on file   Tobacco Use    Smoking status: Never Smoker    Smokeless tobacco: Current User    Tobacco comment: Vape   Substance and Sexual Activity    Alcohol use: Never    Drug use: Not Currently    Sexual activity: Not on file   Other Topics Concern    Not on file   Social History Narrative    Not on file     Social Determinants of Health     Financial Resource Strain:     Difficulty of Paying Living Expenses: Not on file   Food Insecurity:     Worried About Running Out of Food in the Last Year: Not on file    Eliseo of Food in the Last Year: Not on file   Transportation Needs:     Lack of Transportation (Medical): Not on file    Lack of Transportation (Non-Medical): Not on file   Physical Activity:     Days of Exercise per Week: Not on file    Minutes of Exercise per Session: Not on file   Stress:     Feeling of Stress : Not on file   Social Connections:     Frequency of Communication with Friends and Family: Not on file    Frequency of Social Gatherings with Friends and Family: Not on file    Attends Restorationism Services: Not on file    Active Member of [address] [place] [address] or Organizations: Not on file    Attends Club or Organization Meetings: Not on file    Marital Status: Not on file   Intimate Partner Violence:     Fear of Current or Ex-Partner: Not on file    Emotionally Abused: Not on file    Physically Abused: Not on file    Sexually Abused: Not on file   Housing Stability:     Unable to Pay for Housing in the Last Year: Not on file    Number of [place] in the Last Year: Not on file    Unstable Housing in the Last Year: Not on file         ALLERGIES: Patient has no known allergies. Review of Systems   Unable to perform ROS: Mental status change   Respiratory: Negative for shortness of breath. Cardiovascular: Negative for chest pain. Gastrointestinal: Positive for diarrhea and vomiting. Negative for abdominal pain. Neurological: Positive for headaches.        Vitals:    03/02/22 1328 03/02/22 1329 03/02/22 1334 03/02/22 1344   BP:  (!) 143/81     Pulse:  70 (!) 53 63   Resp:  29 14 15   Temp: 97.7 °F (36.5 °C)      SpO2:  100% 100% 95%            Physical Exam  General:  Awake and alert, NAD  HEENT:  NC/AT, equal pupils, +dry mucous membranes  Neck:   Normal inspection, full range of motion  Cardiac:  RRR, no murmurs  Respiratory:  Clear bilaterally, no wheezes, rales, rhonchi  Abdomen:  Soft and nontender, nondistended  Extremities: Warm and well perfused, no peripheral edema  Neuro:  Moving all extremities symmetrically without gross motor deficit  Skin:   No rashes or pallor    RESULTS  Recent Results (from the past 12 hour(s))   GLUCOSE, POC    Collection Time: 03/02/22 12:56 PM   Result Value Ref Range    Glucose (POC) 339 (H) 65 - 117 mg/dL    Performed by Lenard Teague    CBC WITH AUTOMATED DIFF    Collection Time: 03/02/22  1:01 PM   Result Value Ref Range    WBC 7.1 3.6 - 11.0 K/uL    RBC 4.59 3.80 - 5.20 M/uL    HGB 13.6 11.5 - 16.0 g/dL    HCT 40.0 35.0 - 47.0 %    MCV 87.1 80.0 - 99.0 FL    MCH 29.6 26.0 - 34.0 PG    MCHC 34.0 30.0 - 36.5 g/dL    RDW 13.3 11.5 - 14.5 %    PLATELET 535 421 - 554 K/uL    MPV 10.9 8.9 - 12.9 FL    NRBC 0.0 0  WBC    ABSOLUTE NRBC 0.00 0.00 - 0.01 K/uL    NEUTROPHILS 84 (H) 32 - 75 %    LYMPHOCYTES 11 (L) 12 - 49 %    MONOCYTES 3 (L) 5 - 13 %    EOSINOPHILS 1 0 - 7 %    BASOPHILS 1 0 - 1 %    IMMATURE GRANULOCYTES 0 0.0 - 0.5 %    ABS. NEUTROPHILS 5.9 1.8 - 8.0 K/UL    ABS. LYMPHOCYTES 0.8 0.8 - 3.5 K/UL    ABS. MONOCYTES 0.2 0.0 - 1.0 K/UL    ABS. EOSINOPHILS 0.1 0.0 - 0.4 K/UL    ABS. BASOPHILS 0.1 0.0 - 0.1 K/UL    ABS. IMM. GRANS. 0.0 0.00 - 0.04 K/UL    DF SMEAR SCANNED      RBC COMMENTS NORMOCYTIC, NORMOCHROMIC     METABOLIC PANEL, COMPREHENSIVE    Collection Time: 03/02/22  1:01 PM   Result Value Ref Range    Sodium 137 136 - 145 mmol/L    Potassium 3.8 3.5 - 5.1 mmol/L    Chloride 99 97 - 108 mmol/L    CO2 21 21 - 32 mmol/L    Anion gap 17 (H) 5 - 15 mmol/L    Glucose 397 (H) 65 - 100 mg/dL    BUN 18 6 - 20 MG/DL    Creatinine 0.82 0.55 - 1.02 MG/DL    BUN/Creatinine ratio 22 (H) 12 - 20      GFR est AA >60 >60 ml/min/1.73m2    GFR est non-AA >60 >60 ml/min/1.73m2    Calcium 10.0 8.5 - 10.1 MG/DL    Bilirubin, total 0.8 0.2 - 1.0 MG/DL    ALT (SGPT) 24 12 - 78 U/L    AST (SGOT) 28 15 - 37 U/L    Alk.  phosphatase 60 45 - 117 U/L    Protein, total 8.6 (H) 6.4 - 8.2 g/dL    Albumin 4.9 3.5 - 5.0 g/dL    Globulin 3.7 2.0 - 4.0 g/dL    A-G Ratio 1.3 1.1 - 2.2     LACTIC ACID    Collection Time: 03/02/22  1:01 PM   Result Value Ref Range    Lactic acid 2.6 (HH) 0.4 - 2.0 MMOL/L   LIPASE    Collection Time: 03/02/22  1:01 PM   Result Value Ref Range    Lipase 81 73 - 393 U/L   MAGNESIUM    Collection Time: 03/02/22  1:01 PM   Result Value Ref Range    Magnesium 1.6 1.6 - 2.4 mg/dL   HCG QL SERUM    Collection Time: 03/02/22  1:01 PM   Result Value Ref Range    HCG, Ql. Negative NEG     EKG, 12 LEAD, INITIAL    Collection Time: 03/02/22  1:08 PM   Result Value Ref Range    Ventricular Rate 80 BPM    Atrial Rate 441 BPM    QRS Duration 82 ms    Q-T Interval 188 ms    QTC Calculation (Bezet) 216 ms    Calculated R Axis 55 degrees    Calculated T Axis 112 degrees    Diagnosis       Atrial fibrillation  Nonspecific ST and T wave abnormality  Abnormal ECG  When compared with ECG of 26-DEC-2019 17:03,  Atrial fibrillation has replaced Sinus rhythm  ST no longer depressed in Inferior leads  Nonspecific T wave abnormality has replaced inverted T waves in Inferior   leads  QT has shortened     POC VENOUS BLOOD GAS    Collection Time: 03/02/22  1:15 PM   Result Value Ref Range    pH, venous (POC) 7.53 (HH) 7.32 - 7.42      pCO2, venous (POC) 17.7 (L) 41 - 51 MMHG    pO2, venous (POC) 39 25 - 40 mmHg    HCO3, venous (POC) 14.8 (L) 23.0 - 28.0 MMOL/L    sO2, venous (POC) 82.4 65 - 88 %    Base deficit, venous (POC) 5.8 mmol/L    Specimen type (POC) VENOUS BLOOD      Performed by Harmony Fournier     Critical value read back DR. MACIAS         IMAGING  XR CHEST PORT    Result Date: 3/2/2022  Normal chest.      Procedures - none unless documented below  EKG as interpreted by me: Too much artifactual distortion to be able to interpret clearly, this is due to the patient shivering in the setting of hypothermia    ED course: Labs, EKG and imaging reviewed. Presents with vomiting and diarrhea, glucose 397, bicarb normal, gap of 17. Abdomen nontender. Mild DKA with volume depletion.   Will repeat Accucheck after 2 liters IVF completed, admit to the hospitalist service for continued management. Hospitalist Tena Serve for Admission  2:57 PM    ED Room Number:   SER06/06  Patient Name and age:  Nilesh Ann 46 y.o.  female  Working Diagnosis:     1. Type 1 diabetes mellitus with ketoacidosis without coma ([facility] [address]      COVID suspicion:   no  Code Status:    Full Code  Readmission:    no  Isolation Requirements:  no  Recommended Level of Care: telemetry  Department:    [facility] - [phone]  Other:     Presents with vomiting and diarrhea, glucose 397, bicarb normal, gap of 17. Will repeat Accucheck after 2 liters IVF completed.

## 2022-03-02 NOTE — ED NOTES
Pt resting on stretcher at this time. Partner remains at bedside. Will continue to monitor. No vomiting noted.

## 2022-03-03 LAB
ALBUMIN SERPL-MCNC: 3.5 G/DL (ref 3.5–5)
ALBUMIN/GLOB SERPL: 1.2 {RATIO} (ref 1.1–2.2)
ALP SERPL-CCNC: 53 U/L (ref 45–117)
ALT SERPL-CCNC: 19 U/L (ref 12–78)
ANION GAP SERPL CALC-SCNC: 10 MMOL/L (ref 5–15)
ANION GAP SERPL CALC-SCNC: 5 MMOL/L (ref 5–15)
ARTERIAL PATENCY WRIST A: POSITIVE
AST SERPL-CCNC: 20 U/L (ref 15–37)
BASE DEFICIT BLD-SCNC: 3.5 MMOL/L
BDY SITE: ABNORMAL
BILIRUB SERPL-MCNC: 0.7 MG/DL (ref 0.2–1)
BUN SERPL-MCNC: 15 MG/DL (ref 6–20)
BUN SERPL-MCNC: 15 MG/DL (ref 6–20)
BUN/CREAT SERPL: 22 (ref 12–20)
BUN/CREAT SERPL: 26 (ref 12–20)
CALCIUM SERPL-MCNC: 8.4 MG/DL (ref 8.5–10.1)
CALCIUM SERPL-MCNC: 8.7 MG/DL (ref 8.5–10.1)
CHLORIDE SERPL-SCNC: 106 MMOL/L (ref 97–108)
CHLORIDE SERPL-SCNC: 110 MMOL/L (ref 97–108)
CO2 SERPL-SCNC: 20 MMOL/L (ref 21–32)
CO2 SERPL-SCNC: 23 MMOL/L (ref 21–32)
COMMENT, HOLDF: NORMAL
CREAT SERPL-MCNC: 0.57 MG/DL (ref 0.55–1.02)
CREAT SERPL-MCNC: 0.68 MG/DL (ref 0.55–1.02)
EST. AVERAGE GLUCOSE BLD GHB EST-MCNC: 217 MG/DL
GAS FLOW.O2 O2 DELIVERY SYS: ABNORMAL L/MIN
GLOBULIN SER CALC-MCNC: 2.9 G/DL (ref 2–4)
GLUCOSE BLD STRIP.AUTO-MCNC: 113 MG/DL (ref 65–117)
GLUCOSE BLD STRIP.AUTO-MCNC: 134 MG/DL (ref 65–117)
GLUCOSE BLD STRIP.AUTO-MCNC: 207 MG/DL (ref 65–117)
GLUCOSE BLD STRIP.AUTO-MCNC: 261 MG/DL (ref 65–117)
GLUCOSE BLD STRIP.AUTO-MCNC: 309 MG/DL (ref 65–117)
GLUCOSE BLD STRIP.AUTO-MCNC: 87 MG/DL (ref 65–117)
GLUCOSE SERPL-MCNC: 196 MG/DL (ref 65–100)
GLUCOSE SERPL-MCNC: 283 MG/DL (ref 65–100)
HBA1C MFR BLD: 9.2 % (ref 4–5.6)
HCO3 BLD-SCNC: 20.9 MMOL/L (ref 22–26)
PCO2 BLD: 34.6 MMHG (ref 35–45)
PH BLD: 7.39 [PH] (ref 7.35–7.45)
PO2 BLD: 94 MMHG (ref 80–100)
POTASSIUM SERPL-SCNC: 3.6 MMOL/L (ref 3.5–5.1)
POTASSIUM SERPL-SCNC: 4.1 MMOL/L (ref 3.5–5.1)
PROT SERPL-MCNC: 6.4 G/DL (ref 6.4–8.2)
SAMPLES BEING HELD,HOLD: NORMAL
SAO2 % BLD: 97.3 % (ref 92–97)
SERVICE CMNT-IMP: ABNORMAL
SERVICE CMNT-IMP: NORMAL
SERVICE CMNT-IMP: NORMAL
SODIUM SERPL-SCNC: 136 MMOL/L (ref 136–145)
SODIUM SERPL-SCNC: 138 MMOL/L (ref 136–145)
SPECIMEN TYPE: ABNORMAL

## 2022-03-03 PROCEDURE — 82962 GLUCOSE BLOOD TEST: CPT

## 2022-03-03 PROCEDURE — 36600 WITHDRAWAL OF ARTERIAL BLOOD: CPT

## 2022-03-03 PROCEDURE — 74011250636 HC RX REV CODE- 250/636: Performed by: STUDENT IN AN ORGANIZED HEALTH CARE EDUCATION/TRAINING PROGRAM

## 2022-03-03 PROCEDURE — 74011250637 HC RX REV CODE- 250/637: Performed by: STUDENT IN AN ORGANIZED HEALTH CARE EDUCATION/TRAINING PROGRAM

## 2022-03-03 PROCEDURE — 83036 HEMOGLOBIN GLYCOSYLATED A1C: CPT

## 2022-03-03 PROCEDURE — 74011636637 HC RX REV CODE- 636/637: Performed by: STUDENT IN AN ORGANIZED HEALTH CARE EDUCATION/TRAINING PROGRAM

## 2022-03-03 PROCEDURE — 65660000000 HC RM CCU STEPDOWN

## 2022-03-03 PROCEDURE — 99357 PR PROLONGED SVC I/P REQ UNIT/FLOOR TIME EA 30 MIN: CPT | Performed by: CLINICAL NURSE SPECIALIST

## 2022-03-03 PROCEDURE — 82803 BLOOD GASES ANY COMBINATION: CPT

## 2022-03-03 PROCEDURE — 80053 COMPREHEN METABOLIC PANEL: CPT

## 2022-03-03 PROCEDURE — 99233 SBSQ HOSP IP/OBS HIGH 50: CPT | Performed by: CLINICAL NURSE SPECIALIST

## 2022-03-03 PROCEDURE — 99356 PR PROLONGED SVC I/P OR OBS SETTING 1ST HOUR: CPT | Performed by: CLINICAL NURSE SPECIALIST

## 2022-03-03 PROCEDURE — 36415 COLL VENOUS BLD VENIPUNCTURE: CPT

## 2022-03-03 RX ORDER — INSULIN LISPRO 100 [IU]/ML
INJECTION, SOLUTION INTRAVENOUS; SUBCUTANEOUS EVERY 6 HOURS
Status: DISCONTINUED | OUTPATIENT
Start: 2022-03-03 | End: 2022-03-03

## 2022-03-03 RX ORDER — INSULIN LISPRO 100 [IU]/ML
INJECTION, SOLUTION INTRAVENOUS; SUBCUTANEOUS
Status: DISCONTINUED | OUTPATIENT
Start: 2022-03-03 | End: 2022-03-04 | Stop reason: HOSPADM

## 2022-03-03 RX ORDER — INSULIN LISPRO 100 [IU]/ML
7 INJECTION, SOLUTION INTRAVENOUS; SUBCUTANEOUS ONCE
Status: DISCONTINUED | OUTPATIENT
Start: 2022-03-03 | End: 2022-03-03

## 2022-03-03 RX ORDER — INSULIN LISPRO 100 [IU]/ML
7 INJECTION, SOLUTION INTRAVENOUS; SUBCUTANEOUS
Status: DISCONTINUED | OUTPATIENT
Start: 2022-03-03 | End: 2022-03-03

## 2022-03-03 RX ORDER — DULOXETIN HYDROCHLORIDE 60 MG/1
60 CAPSULE, DELAYED RELEASE ORAL DAILY
Status: DISCONTINUED | OUTPATIENT
Start: 2022-03-03 | End: 2022-03-04 | Stop reason: HOSPADM

## 2022-03-03 RX ORDER — INSULIN GLARGINE 100 [IU]/ML
0.2 INJECTION, SOLUTION SUBCUTANEOUS DAILY
Status: DISCONTINUED | OUTPATIENT
Start: 2022-03-03 | End: 2022-03-03

## 2022-03-03 RX ORDER — GABAPENTIN 400 MG/1
1600 CAPSULE ORAL 3 TIMES DAILY
Status: DISCONTINUED | OUTPATIENT
Start: 2022-03-03 | End: 2022-03-03

## 2022-03-03 RX ORDER — INSULIN GLARGINE 100 [IU]/ML
0.2 INJECTION, SOLUTION SUBCUTANEOUS DAILY
Status: DISCONTINUED | OUTPATIENT
Start: 2022-03-03 | End: 2022-03-04 | Stop reason: HOSPADM

## 2022-03-03 RX ORDER — GABAPENTIN 400 MG/1
400 CAPSULE ORAL 3 TIMES DAILY
Status: DISCONTINUED | OUTPATIENT
Start: 2022-03-03 | End: 2022-03-04 | Stop reason: HOSPADM

## 2022-03-03 RX ORDER — MAGNESIUM SULFATE 100 %
4 CRYSTALS MISCELLANEOUS AS NEEDED
Status: DISCONTINUED | OUTPATIENT
Start: 2022-03-03 | End: 2022-03-04 | Stop reason: HOSPADM

## 2022-03-03 RX ADMIN — Medication 2 UNITS: at 12:37

## 2022-03-03 RX ADMIN — Medication 5 UNITS: at 06:26

## 2022-03-03 RX ADMIN — Medication 2 UNITS: at 17:36

## 2022-03-03 RX ADMIN — Medication 2 UNITS: at 12:33

## 2022-03-03 RX ADMIN — DULOXETINE HYDROCHLORIDE 60 MG: 60 CAPSULE, DELAYED RELEASE ORAL at 09:11

## 2022-03-03 RX ADMIN — GABAPENTIN 400 MG: 400 CAPSULE ORAL at 23:21

## 2022-03-03 RX ADMIN — GABAPENTIN 400 MG: 400 CAPSULE ORAL at 09:11

## 2022-03-03 RX ADMIN — SODIUM CHLORIDE 1000 ML: 9 INJECTION, SOLUTION INTRAVENOUS at 06:27

## 2022-03-03 RX ADMIN — GABAPENTIN 400 MG: 400 CAPSULE ORAL at 17:38

## 2022-03-03 RX ADMIN — INSULIN GLARGINE 13 UNITS: 100 INJECTION, SOLUTION SUBCUTANEOUS at 10:40

## 2022-03-03 NOTE — DIABETES MGMT
[facility]    CLINICAL NURSE SPECIALIST CONSULT     Initial Presentation   Brock Shepherd is a 46 y.o. female admitted from [address] [place] [address] with c/o n/v, diarrhea and AMS. In ED / anion gap 17/ + large ketones in urine. Next BMP not drawn until 3/3/22 0355 (15 hrs later), , anion gap 10. A1C 9.2%. Lactic on admission 2.6. ABG @ [facility] 7.5/C02 17.7, repeat [facility] @[facility] 7.39/ Co2 34.6. HX:   Past Medical History:   Diagnosis Date    Diabetes ([facility]     Type 1 diabetes (HCC)         INITIAL DX:   Hyperglycemia [R73.9]     Current Treatment     TX: IVF/ insulin    Consulted by Provider for advanced diabetes nursing assessment and care for:   [x] Inpatient management strategy  [x] Home management assessment  [x] Survival skill education    Hospital Course   Clinical progress has been complicated by DKA in setting of T1DM. Diabetes History   T1DM since 3yrs ago- likely BRENDAN onset as she stated she \"was suffering with symptoms of chronic fatigue for 8mo before diagnosis\" with 20lb weight loss over that time period. At time of her T1DM diagnosis 6/2019,  she was admitted to [facility] for  due to severe DKA. She was then followed by Dr. Taya Martinez in July 2019. In Aug. 2019 hospitaized @ [facility] 8/22-8/29 r/t severe hypoglycemia causing seizure, BG 22. She had brought down her A1C from 15.5% in June 2019 to 6.4% in Sept. 2019- Started seeing Dr. Mariia Campbell in Sept 2019, and 12/2019.   Currently followed by Dr. Neptali Calero, with [facility] and had visit in Sept. 2021- A1C in Sept. 2021- 7.8%-    Diabetes-related Medical History  Acute complications  DKA- resolved  Neurological complications  Hypoglycemia unawareness and Peripheral neuropathy  Microvascular disease  NONE-had recent cataract procedure 2/21/22  Macrovascular disease  NONE  Other associated conditions     NONE    Diabetes Medication History  Key Antihyperglycemic Medications             insulin glargine (LANTUS U-100 INSULIN) 100 unit/mL injection (Taking) 12 Units by SubCUTAneous route nightly. insulin lispro (HUMALOG) 100 unit/mL injection (Taking) As directed for carbohydrates (1:10) and for high glucoses (1:25)           Diabetes self-management practices:   Eating pattern-counts carbs;    [x] Eating a carbohydrate-controlled mealplan  [x] Breakfast Toast /protein shake/ eggs/clayton  [x] Lunch  \"on the go\" at work will eat in spurts-sandwich/ pretzels  Pepperoni/cheese w/nuts  [x] Dinner  Depends on her #s-will eat more protein if numbers are high    [x] Snacks/ or '4th' dinner- same as dinner  [x] Beverages Water/ sugar free soda  Physical activity pattern-  and works as  for [employer],    [x] Employing a physical activity program to control BG    Monitoring pattern-Dexcom CGM-    [x] Testing BGs sufficiently to inform self-management adjustments   Goal - as she finds this a good spot that she can successfully get through the day without fear of lows  Was having frequent low BG (30-40s ) in middle of the night after taking Lantus insulin (21units) at 7pm-she would go low around 0200-  Taking medications pattern  [x] Consistent administration -recently stopped taking   [x] Affordable  Social determinants of health impacting diabetes self-management practices   Struggling with anxiety and/or depression and Concerned that you need to know more about how to stay healthy with diabetes  Overall evaluation:    [x] Striving to achieving A1c target with drug therapy & self-care practices    Subjective   Sitting up in bed this AM, states she feels better -has not vomited since late last evening. Tolerated about 25% of her breakfast.      Family member at her bedside, and also participated in patient's interview and PMH      Objective   Physical exam  General Normal weight   Female  in no acute distress.  Conversant and cooperative  Neuro  Alert, oriented   Vital Signs   Visit Vitals  BP (!) 99/55 (BP 1 Location: Left upper arm, BP Patient Position: At rest)   Pulse 70   Temp 97.6 °F (36.4 °C)   Resp 14   Wt 64.3 kg (141 lb 11.2 oz)   SpO2 96%   BMI 23.22 kg/m²     Skin  Warm and dry. Heart   Regular rate and rhythm. No murmurs, rubs or gallops  Lungs  Clear to auscultation without rales or rhonchi  Extremities No foot wounds    Diabetic foot exam: + diagnosed peripheral neuropathy    Left Foot     Visual Exam: normal    Pulse DP: 2+ (normal)   Filament test: reduced sensation      Right Foot   Visual Exam: normal    Pulse DP: 2+ (normal)   Filament test: reduced sensation     DP & PT pulses +2.      Laboratory  Recent Labs     03/03/22  0355 03/02/22  1301   * 397*   AGAP 10 17*   WBC  --  7.1   CREA 0.57 0.82   GFRNA >60 >60   AST 20 28   ALT 19 24       Factors impacting BG management  Factor Dose Comments   Nutrition:  Standard meals     60 grams/meal  s    Pain Neuropathic in feet    Infection n/a    Other:   Kidney function  Liver function     WNL  WNL      Blood glucose pattern      Significant diabetes-related events over the past 24-72 hours  A1C 9.2%  Initially presented in mild DKA w/out acidosis and corrected with IVF   3/3/22- Fasting , had about 26% of breakfast this AM, n/v subsided with meds/IVF    Assessment and Plan   Nursing Diagnosis Risk for unstable blood glucose pattern   Nursing Intervention Domain 5250 Decision-making Support   Nursing Interventions Examined current inpatient diabetes/blood glucose control   Explored factors facilitating and impeding inpatient management  Explored corrective strategies with patient and responsible inpatient provider   Informed patient of rational for insulin strategy while hospitalized     Nursing Diagnosis 51613 Ineffective Health Management   Nursing Intervention Domain 5250 Decision-makingSupport   Nursing Interventions Identified diabetes self-management practices impeding diabetes control  Discussed diabetes survival skills related to  1. Healthy Plate eating plan; given handouts  2. Role of physical activity in improving insulin sensitivity and action  3. Procedure for blood glucose monitoring & options for low-cost products available from [facility]   4. Medications plan at discharge     Evaluation   This is a 44yo female with T1DM/ BRENDAN since 2019. Presented to [facility] in mild DKA and transferred last PM to [facility] for [facility] care. DKA since resolved. Since her T1DM diagnosis she has struggled with managing her BG levels and has had multiple issues/ and ED /hospital visits for severe hypoglycemia. Most recently she was on Lantus/Humalog TID/ and correction insulin regimen but was still going low in middle of night after taking Lantus at 1900. She recently stopped taking the Lantus due the \"lows\" and was using Humalog to 'cover for meals and correctional' but was finding out she was spiking high. She has a Dexcom, and utilizes data appropriately. Her follow up care with endocrinologist is consistent, and has another appt. Scheduled in mid march. She verbalizes frustrations with finding a 'good medium' for managing her T1DM without lows or significant high BG. She is engaged and wants to do the right things to manage her disease. She states, \"I'm a rule follower and will do what I am told\"       While hopitalized, will initiate insulin subcutaneous order set with recs below. Target . Conferred with hospitalist , Dr. Cresencio Dominique, re: recs with his agreement. Will continue to work with patient/ MD to come up with a sustainable discharge plan to ensure reduced risk of low BG. Also verbalized plan with patient's RN with verbal understanding of plan. This patient would benefit from diabetes self-management education and support (DSMES) after discharge-she has been through initial diabetes education at time of diagnosis in 2019 but could use assistance from an RD and CDE to further support her.      Recommendations     [] Use of Subcutaneous Insulin Order set (5165)  Insulin Dosing Specific recommendation   START Basal                                      (Based on weight, BMI & GFR) [x]        0.2 units/kg/D =13units Lantus daily in AM      ADD carb ratio  Nutritional                                      (Based on CHO/dextrose load) 1unit of insulin for every 20g carbs. MODIFIED Corrective  ACHS             (Useful in adjusting insulin dosing) [x] HIGH sensitivity        2. Consider GI consult as she could be prone to gastroparesis given recent hypoglycemia events and now with GI symptoms -    Discharge planning   1. Patient desires to see another endocrinologist she had seen a couple years ago, Dr. Vipul Baez- will see if she can get an appointment. Otherwise she is established with [facility] endocrinology    2. Since T1DM will require basal /bolus /correction insulin at discharge- dose TBD    Billing Code(s)   [id number]  25468    Before making these care recommendations, I personally reviewed the hospitalization record, including notes, laboratory & diagnostic data and current medications, and examined the patient at the bedside (circumstances permitting) before making care recommendations. More than fifty (50) percent of the time was spent in patient counseling and/or care coordination.   Total minutes: 144 Israel Simon, CNS  Diabetes Clinical Nurse Specialist  [facility]  Access via [address] [place] [address]

## 2022-03-03 NOTE — ED NOTES
Telephone report given to [place] Plaza RN [facility] nurse) by Kevin Barillas RN. Report included the following information SBAR, ED Summary, Intake/Output, MAR and Recent Results. Pending transportation ETA: 2045.

## 2022-03-03 NOTE — PROGRESS NOTES
[facility]  Hospitalist Group                                                                                          Hospitalist Progress Note  Epifanio Miller MD  Answering service: [phone] OR [phone] from in house phone        Date of Service:  3/3/2022  NAME:  Alondra Hirsch  :  1980  MRN:  [id number]      Admission Summary:   46 y.o. female presents with few hours duration of abrupt onset, constant, unchanging, severe, vomiting that is associated with symptoms of diarrhea and altered sensorium. Patient denies exacerbating features  and denies remitting features. Interval history / Subjective:   Patient seen for follow-up of DKA and hyperglycemia. Patient seen and examined earlier this morning by me. Patient reports feeling good has no acute complaints. She reports that her diabetes has been difficult to control as she tends to go up and down and that her basal insulin was making her have be hypoglycemic so she stopped taking it and was only using her short acting.   She reports being very sensitive to hypoglycemia and insulin in general.     Assessment & Plan:     DKA (diabetic ketoacidosis) ([address] (2019)  Resolved  We will continue to monitor with labs      Type 1 diabetes ([address] ()  Diabetes team consulted to help manage  At this point we are doing 0.2 units/kg/day of Lantus +1 unit of insulin for every 20 g of carbs plus high sensitivity sliding scale insulin with short acting insulin  We will see if this helps control the patient and she needs a better insulin regimen for discharge      Hyperglycemia (3/2/2022)  As above    Code status: full  DVT prophylaxis: SCD    Care Plan discussed with: Patient/Family and Nurse  Anticipated Disposition: Home w/Family  Anticipated Discharge: 24 hours to 48 hours     Hospital Problems  Date Reviewed: 2019          Codes Class Noted POA    Hyperglycemia ICD-10-CM: R73.9  ICD-9-CM: 790.29  3/2/2022 Unknown        Type 1 diabetes ([facility] ICD-10-CM: E10.9  ICD-9-CM: 250.01  Unknown Yes        DKA (diabetic ketoacidosis) ([facility] ICD-10-CM: E11.10  ICD-9-CM: 250.12  6/13/2019 Yes                Review of Systems:   A comprehensive review of systems was negative except for that written in the HPI. Vital Signs:    Last 24hrs VS reviewed since prior progress note. Most recent are:  Visit Vitals  /60   Pulse 70   Temp 97.6 °F (36.4 °C)   Resp 14   Wt 64.3 kg (141 lb 11.2 oz)   SpO2 97%   BMI 23.22 kg/m²         Intake/Output Summary (Last 24 hours) at 3/3/2022 1351  Last data filed at 3/2/2022 1530  Gross per 24 hour   Intake 1000 ml   Output --   Net 1000 ml        Physical Examination:     I had a face to face encounter with this patient and independently examined them on 3/3/2022 as outlined below:          Constitutional:  No acute distress, cooperative, pleasant    ENT:  Oral mucosa moist, oropharynx benign. Resp:  CTA bilaterally. No wheezing/rhonchi/rales. No accessory muscle use   CV:  Regular rhythm, normal rate, no murmurs, gallops, rubs    GI:  Soft, non distended, non tender. normoactive bowel sounds, no hepatosplenomegaly     Musculoskeletal:  No edema, warm, 2+ pulses throughout    Neurologic:  Moves all extremities.   AAOx3, CN II-XII reviewed            Data Review:    Review and/or order of clinical lab test  Review and/or order of tests in the radiology section of CPT  Review and/or order of tests in the medicine section of CPT      Labs:     Recent Labs     03/02/22  1301   WBC 7.1   HGB 13.6   HCT 40.0        Recent Labs     03/03/22  0757 03/03/22  0355 03/02/22  1301    136 137   K 3.6 4.1 3.8   * 106 99   CO2 23 20* 21   BUN 15 15 18   CREA 0.68 0.57 0.82   * 283* 397*   CA 8.4* 8.7 10.0   MG  --   --  1.6     Recent Labs     03/03/22  0355 03/02/22  1301   ALT 19 24   AP 53 60   TBILI 0.7 0.8   TP 6.4 8.6*   ALB 3.5 4.9   GLOB 2.9 3.7   LPSE  --  81     No results for input(s): INR, PTP, APTT, INREXT in the last 72 hours. No results for input(s): FE, TIBC, PSAT, FERR in the last 72 hours. No results found for: FOL, RBCF   No results for input(s): PH, PCO2, PO2 in the last 72 hours. No results for input(s): CPK, CKNDX, TROIQ in the last 72 hours.     No lab exists for component: CPKMB  Lab Results   Component Value Date/Time    Cholesterol, total 148 06/13/2019 10:45 AM    HDL Cholesterol 33 06/13/2019 10:45 AM    LDL, calculated 61 06/13/2019 10:45 AM    Triglyceride 270 (H) 06/13/2019 10:45 AM    CHOL/HDL Ratio 4.5 06/13/2019 10:45 AM     Lab Results   Component Value Date/Time    Glucose (POC) 207 (H) 03/03/2022 11:17 AM    Glucose (POC) 261 (H) 03/03/2022 06:03 AM    Glucose (POC) 309 (H) 03/03/2022 03:30 AM    Glucose (POC) 315 (H) 03/02/2022 03:44 PM    Glucose (POC) 339 (H) 03/02/2022 12:56 PM     Lab Results   Component Value Date/Time    Color YELLOW/STRAW 03/02/2022 01:01 PM    Appearance CLEAR 03/02/2022 01:01 PM    Specific gravity 1.020 03/02/2022 01:01 PM    Specific gravity 1.021 06/22/2019 12:53 AM    pH (UA) 5.5 03/02/2022 01:01 PM    Protein Negative 03/02/2022 01:01 PM    Glucose >1,000 (A) 03/02/2022 01:01 PM    Ketone >80 (A) 03/02/2022 01:01 PM    Bilirubin Negative 03/02/2022 01:01 PM    Urobilinogen 0.2 03/02/2022 01:01 PM    Nitrites Negative 03/02/2022 01:01 PM    Leukocyte Esterase Negative 03/02/2022 01:01 PM    Epithelial cells FEW 03/02/2022 01:01 PM    Bacteria Negative 03/02/2022 01:01 PM    WBC 0-4 03/02/2022 01:01 PM    RBC 10-20 03/02/2022 01:01 PM         Medications Reviewed:     Current Facility-Administered Medications   Medication Dose Route Frequency    DULoxetine (CYMBALTA) capsule 60 mg  60 mg Oral DAILY    gabapentin (NEURONTIN) capsule 400 mg  400 mg Oral TID    glucose chewable tablet 16 g  4 Tablet Oral PRN    dextrose 10 % infusion 0-250 mL  0-250 mL IntraVENous PRN    glucagon (GLUCAGEN) injection 1 mg  1 mg IntraMUSCular PRN    dextrose 10 % infusion 0-250 mL  0-250 mL IntraVENous PRN    insulin lispro (HUMALOG) injection   SubCUTAneous TIDAC    insulin lispro (HUMALOG) injection   SubCUTAneous AC&HS    insulin glargine (LANTUS) injection 13 Units  0.2 Units/kg SubCUTAneous DAILY     ______________________________________________________________________  EXPECTED LENGTH OF STAY: - - -  ACTUAL LENGTH OF STAY:          Leticia Hebert MD

## 2022-03-03 NOTE — PROGRESS NOTES
03/03- 2015: Pt arrived via [facility] from [place] [facility]. Dr. Ja Huang notified of patient arrival. Awaiting admission orders. 03/04- 0230: First orders received for pt, stat ABG and CMP, respiratory paged    0600- MD arrived to [facility]    Bedside shift change report given to [address] (oncoming nurse) by Shadia Curry RN (offgoing nurse). Report included the following information SBAR, Kardex, MAR, Recent Results, Alarm Parameters  and Quality Measures.

## 2022-03-03 NOTE — PROGRESS NOTES
Problem: Falls - Risk of  Goal: *Absence of Falls  Description: Document Strang Fall Risk and appropriate interventions in the flowsheet.   Outcome: Progressing Towards Goal  Note: Fall Risk Interventions:       Problem: Patient Education: Go to Patient Education Activity  Goal: Patient/Family Education  Outcome: Progressing Towards Goal     Problem: Diabetes Maintenance:Admission  Goal: Activity/Safety  Outcome: Progressing Towards Goal  Goal: Diagnostic Tests/Procedures  Outcome: Progressing Towards Goal  Goal: Nutrition  Outcome: Progressing Towards Goal  Goal: Medications  Outcome: Progressing Towards Goal  Goal: Treatments/Interventions/Procedures  Outcome: Progressing Towards Goal

## 2022-03-03 NOTE — H&P
PLEASE NOTE: I HAVE GENERATED THIS NOTE WITH THE ASSISTANCE OF VOICE-RECOGNITION TECHNOLOGY. PLEASE EXCUSE ANY SPELLING, GRAMMATICAL, AND SYNTAX ERRORS YOU MAY FIND. IF YOU NEED CLARIFICATION ON ANYTHING, PLEASE FEEL FREE TO REACH OUT TO ME.  [address] [facility] [facility]  Hospitalist Group  History and Physical - Dr. Dominick Oliva    Primary Care Provider: Love Rivera NP  Date of Service:  See Date Note Was Originated    Chief Complaint: Nausea    Subjective:     46 y.o. female presents with few hours duration of abrupt onset, constant, unchanging, severe, vomiting that is associated with symptoms of diarrhea and altered sensorium. Patient denies exacerbating features  and denies remitting features. Patient was found to have an anion gap of 17, bicarb of 22, and ketones in the urine; I put in an order for an ABG very early on, finally resulted and showed a relatively normal pH    Review of Systems:  12 point ROS obtained and otherwise negative, except as per HPI and above. Past Medical History:   Diagnosis Date    [facility]     Type 1 diabetes ([facility]       Past Surgical History:   Procedure Laterality Date    HX ACL RECONSTRUCTION       Prior to Admission medications    Medication Sig Start Date End Date Taking? Authorizing Provider   insulin glargine (LANTUS U-100 INSULIN) 100 unit/mL injection 12 Units by SubCUTAneous route nightly. Yes Other, MD Jessica   DULoxetine (CYMBALTA) 60 mg capsule Take 60 mg by mouth daily. Yes Other, MD Jessica   gabapentin (NEURONTIN) 300 mg capsule Take 1,600 mg by mouth three (3) times daily. Yes Other, MD Jessica   insulin lispro (HUMALOG) 100 unit/mL injection As directed for carbohydrates (1:10) and for high glucoses (1:25) 7/2/19  Yes Adelina Brad, MD   [employer] [facility] [address] Use as directed for glucose monitoring. Change every 14 days. 2 sensors for 14 days.  7/2/19   Adelina Salinas MD     No Known Allergies   Family History   Problem Relation Age of Onset   [facility] Arthritis-rheumatoid Mother     Asthma Mother     Thyroid Disease Mother     Cancer Father     Lung Cancer Father    . Social History     Socioeconomic History    Marital status: UNKNOWN   Tobacco Use    Smoking status: Never Smoker    Smokeless tobacco: Current User    Tobacco comment: Vape   Substance and Sexual Activity    Alcohol use: Never    Drug use: Not Currently   . Objective:     Physical Exam:     VS as below    Const'l:          Normal body habitus, a&o, no acute distress  Head/Neck:       neck supple, no jvd, trachea midline, carotid midline, no cervical/head mass  Eyes:     nitza, nonicteric sclera, eom intact  ENT:      auditory acuity grossly intact, no nasal deformity  Cardio:           Regular rate regular rhythm, no murmurs/rubs/gallops, no carotid bruit, normal s1, s2  Pulm:     no accessory muscle use, equal normal breath sounds bilaterally, clear tab  Abd:       Soft, nontender, nondistended normal bowel sounds x 4 quadrants, no palpable masses  Derm:     no rashes, no ulcers, no lesions  Extr:      No edema, no cyanosis, no calf tenderness, no varicosities  Neuro:    cn II-XII grossly intact, muscle strength intact, sensation intact  Psych:   mood intact, judgement intact    Data Review: All diagnostic labs and studies have been reviewed.     .    Assessment:     Active Problems:    DKA (diabetic ketoacidosis) ([facility] (6/13/2019)      Type 1 diabetes ([facility] ()      Hyperglycemia (3/2/2022)        Plan:     DKA  -Patient is technically in DKA, but I am not sure if it is severe enough to necessitate insulin drip  -I am still waiting on labs that I ordered very early on; once these come back, I will determine if the patient needs to be transferred to a higher level of care or if she can be managed without an insulin drip  -In the meantime, will manage patient with subcutaneous insulin  Fluid resuscitate the patient    Type 1 diabetes  -Hold patient's home medications the time being      Code status was discussed with the patient.   Code status entered as per the orders  Signed By: Yaakov Alvarado DO

## 2022-03-03 NOTE — PROGRESS NOTES
Transition of Care Plan   RUR- Low  6%    DISPOSITION: Home with partner when stable   F/U with PCP/Specialist     Transport: Lor griffin [phone]    Reason for Admission:  Nausea                      RUR Score:          6%           Plan for utilizing home health:      No hx of HH, no therapy     PCP: First and Last name:  Suraj Aldrich NP     Name of Practice:    Are you a current patient: Yes/No: Yes   Approximate date of last visit: Jan 12 2022   Can you participate in a virtual visit with your PCP:                     Current Advanced Directive/Advance Care Plan: Full Code      Healthcare Decision Maker: PartnerLor  Click here to complete [employer] including selection of the [employer] Relationship (ie \"Primary\")                             Transition of Care Plan:                      CM met with patient and patient's partner, Lena Councilman at bedside to introduce self and role. Living situation: Lives with partner in 2 story home, 3 steps to enter  ADLs: independent  DME: none  Previous IPR/SNF: none  Previous home health: none  Demographics: confirmed, [facility] in [address] point of contact: Jagruti Del Cid, [phone]    CM to follow patient progress and assist as recommended with DEBBIE plan. Care Management Interventions  PCP Verified by CM: Yes  Last Visit to PCP: 01/12/22  Palliative Care Criteria Met (RRAT>21 & CHF Dx)?: No  Mode of Transport at Discharge:  (partner)  [place] [address] [place] [place]: No  Discharge Durable Medical Equipment: No  Health Maintenance Reviewed: Yes  Physical Therapy Consult: No  Occupational Therapy Consult: No  Speech Therapy Consult: No  Support Systems: Spouse/Significant Other  Confirm Follow Up Transport: Family  Discharge Location  Patient Expects to be Discharged to[de-identified] [facility]

## 2022-03-04 VITALS
BODY MASS INDEX: 23.22 KG/M2 | DIASTOLIC BLOOD PRESSURE: 81 MMHG | TEMPERATURE: 97.9 F | HEART RATE: 55 BPM | RESPIRATION RATE: 16 BRPM | OXYGEN SATURATION: 99 % | WEIGHT: 141.7 LBS | SYSTOLIC BLOOD PRESSURE: 130 MMHG

## 2022-03-04 PROBLEM — R73.9 HYPERGLYCEMIA: Status: RESOLVED | Noted: 2022-03-02 | Resolved: 2022-03-04

## 2022-03-04 PROBLEM — E11.10 DKA (DIABETIC KETOACIDOSIS) (HCC): Status: RESOLVED | Noted: 2019-06-13 | Resolved: 2022-03-04

## 2022-03-04 LAB
ANION GAP SERPL CALC-SCNC: 3 MMOL/L (ref 5–15)
BASOPHILS # BLD: 0.1 K/UL (ref 0–0.1)
BASOPHILS NFR BLD: 1 % (ref 0–1)
BUN SERPL-MCNC: 12 MG/DL (ref 6–20)
BUN/CREAT SERPL: 21 (ref 12–20)
CALCIUM SERPL-MCNC: 8.6 MG/DL (ref 8.5–10.1)
CHLORIDE SERPL-SCNC: 107 MMOL/L (ref 97–108)
CO2 SERPL-SCNC: 28 MMOL/L (ref 21–32)
COMMENT, HOLDF: NORMAL
CREAT SERPL-MCNC: 0.57 MG/DL (ref 0.55–1.02)
DIFFERENTIAL METHOD BLD: ABNORMAL
EOSINOPHIL # BLD: 0.4 K/UL (ref 0–0.4)
EOSINOPHIL NFR BLD: 7 % (ref 0–7)
ERYTHROCYTE [DISTWIDTH] IN BLOOD BY AUTOMATED COUNT: 13.2 % (ref 11.5–14.5)
GLUCOSE BLD STRIP.AUTO-MCNC: 130 MG/DL (ref 65–117)
GLUCOSE BLD STRIP.AUTO-MCNC: 132 MG/DL (ref 65–117)
GLUCOSE SERPL-MCNC: 133 MG/DL (ref 65–100)
HCT VFR BLD AUTO: 34.2 % (ref 35–47)
HGB BLD-MCNC: 11.2 G/DL (ref 11.5–16)
IMM GRANULOCYTES # BLD AUTO: 0 K/UL (ref 0–0.04)
IMM GRANULOCYTES NFR BLD AUTO: 0 % (ref 0–0.5)
LYMPHOCYTES # BLD: 1.8 K/UL (ref 0.8–3.5)
LYMPHOCYTES NFR BLD: 34 % (ref 12–49)
MCH RBC QN AUTO: 29 PG (ref 26–34)
MCHC RBC AUTO-ENTMCNC: 32.7 G/DL (ref 30–36.5)
MCV RBC AUTO: 88.6 FL (ref 80–99)
MONOCYTES # BLD: 0.4 K/UL (ref 0–1)
MONOCYTES NFR BLD: 8 % (ref 5–13)
NEUTS SEG # BLD: 2.7 K/UL (ref 1.8–8)
NEUTS SEG NFR BLD: 50 % (ref 32–75)
NRBC # BLD: 0 K/UL (ref 0–0.01)
NRBC BLD-RTO: 0 PER 100 WBC
PLATELET # BLD AUTO: 186 K/UL (ref 150–400)
PMV BLD AUTO: 10.6 FL (ref 8.9–12.9)
POTASSIUM SERPL-SCNC: 3.8 MMOL/L (ref 3.5–5.1)
RBC # BLD AUTO: 3.86 M/UL (ref 3.8–5.2)
SAMPLES BEING HELD,HOLD: NORMAL
SERVICE CMNT-IMP: ABNORMAL
SERVICE CMNT-IMP: ABNORMAL
SODIUM SERPL-SCNC: 138 MMOL/L (ref 136–145)
WBC # BLD AUTO: 5.4 K/UL (ref 3.6–11)

## 2022-03-04 PROCEDURE — 80048 BASIC METABOLIC PNL TOTAL CA: CPT

## 2022-03-04 PROCEDURE — 85025 COMPLETE CBC W/AUTO DIFF WBC: CPT

## 2022-03-04 PROCEDURE — 36415 COLL VENOUS BLD VENIPUNCTURE: CPT

## 2022-03-04 PROCEDURE — 82962 GLUCOSE BLOOD TEST: CPT

## 2022-03-04 PROCEDURE — 99233 SBSQ HOSP IP/OBS HIGH 50: CPT | Performed by: CLINICAL NURSE SPECIALIST

## 2022-03-04 PROCEDURE — 74011636637 HC RX REV CODE- 636/637: Performed by: STUDENT IN AN ORGANIZED HEALTH CARE EDUCATION/TRAINING PROGRAM

## 2022-03-04 PROCEDURE — 74011250637 HC RX REV CODE- 250/637: Performed by: STUDENT IN AN ORGANIZED HEALTH CARE EDUCATION/TRAINING PROGRAM

## 2022-03-04 RX ORDER — INSULIN GLARGINE 100 [IU]/ML
INJECTION, SOLUTION SUBCUTANEOUS
Qty: 5 ML | Refills: 0 | Status: SHIPPED | OUTPATIENT
Start: 2022-03-04 | End: 2022-03-04

## 2022-03-04 RX ORDER — INSULIN LISPRO 100 [IU]/ML
INJECTION, SOLUTION INTRAVENOUS; SUBCUTANEOUS
Qty: 1 ML | Refills: 0 | Status: SHIPPED | OUTPATIENT
Start: 2022-03-04

## 2022-03-04 RX ORDER — INSULIN GLARGINE 100 [IU]/ML
INJECTION, SOLUTION SUBCUTANEOUS
Qty: 5 PEN | Refills: 0 | Status: SHIPPED | OUTPATIENT
Start: 2022-03-04 | End: 2022-03-04

## 2022-03-04 RX ORDER — IBUPROFEN 200 MG
CAPSULE ORAL
Qty: 100 STRIP | Refills: 0 | Status: SHIPPED | OUTPATIENT
Start: 2022-03-04

## 2022-03-04 RX ADMIN — INSULIN GLARGINE 13 UNITS: 100 INJECTION, SOLUTION SUBCUTANEOUS at 09:37

## 2022-03-04 RX ADMIN — DULOXETINE HYDROCHLORIDE 60 MG: 60 CAPSULE, DELAYED RELEASE ORAL at 09:37

## 2022-03-04 RX ADMIN — GABAPENTIN 400 MG: 400 CAPSULE ORAL at 09:37

## 2022-03-04 NOTE — DIABETES MGMT
[address] [facility]    CLINICAL NURSE SPECIALIST CONSULT     Initial Presentation   Diana Goldsmith is a 46 y.o. female admitted from [address] [place] [address] with c/o n/v, diarrhea and AMS. In ED / anion gap 17/ + large ketones in urine. Next BMP not drawn until 3/3/22 0355 (15 hrs later), , anion gap 10. A1C 9.2%. Lactic on admission 2.6. ABG @ [facility] 7.5/C02 17.7, repeat ABG @[facility] 7.39/ Co2 34.6. HX:   Past Medical History:   Diagnosis Date    Diabetes ([facility] [address]     Type 1 diabetes (HCC)         INITIAL DX:   Hyperglycemia [R73.9]     Current Treatment     TX: IVF/ insulin    Consulted by Provider for advanced diabetes nursing assessment and care for:   [x] Inpatient management strategy  [x] Home management assessment  [x] Survival skill education    Hospital Course   Clinical progress has been complicated by DKA in setting of T1DM.     3/4/22: Slated for d/c today-BG trends without hypoglycemia and remain in target 100-180. Diabetes History   T1DM since 3yrs ago- likely BRENDAN onset as she stated she \"was suffering with symptoms of chronic fatigue for 8mo before diagnosis\" with 20lb weight loss over that time period. At time of her T1DM diagnosis 6/2019,  she was admitted to [facility] for  due to severe DKA. She was then followed by Dr. Ebenezer Szymanski in July 2019. In Aug. 2019 hospitaized @ [facility] 8/22-8/29 r/t severe hypoglycemia causing seizure, BG 22. She had brought down her A1C from 15.5% in June 2019 to 6.4% in Sept. 2019- Started seeing Dr. Reed Fleming in Sept 2019, and 12/2019.   Currently followed by Dr. Emy Caldwell, with [facility] and had visit in Sept. 2021- A1C in Sept. 2021- 7.8%-    Diabetes-related Medical History  Acute complications  DKA- resolved  Neurological complications  Hypoglycemia unawareness and Peripheral neuropathy  Microvascular disease  NONE-had recent cataract procedure 2/21/22  Macrovascular disease  NONE  Other associated conditions     NONE    Diabetes Medication History  Key Antihyperglycemic Medications             insulin glargine (LANTUS U-100 INSULIN) 100 unit/mL injection (Taking) 12 Units by SubCUTAneous route nightly. insulin lispro (HUMALOG) 100 unit/mL injection (Taking) As directed for carbohydrates (1:10) and for high glucoses (1:25)           Diabetes self-management practices:   Eating pattern-counts carbs;    [x] Eating a carbohydrate-controlled mealplan  [x] Breakfast Toast /protein shake/ eggs/clayton  [x] Lunch  \"on the go\" at work will eat in spurts-sandwich/ pretzels  Pepperoni/cheese w/nuts  [x] Dinner  Depends on her #s-will eat more protein if numbers are high    [x] Snacks/ or '4th' dinner- same as dinner  [x] Beverages Water/ sugar free soda  Physical activity pattern-  and works as  for [employer],    [x] Employing a physical activity program to control BG    Monitoring pattern-Dexcom CGM-    [x] Testing BGs sufficiently to inform self-management adjustments   Goal - as she finds this a good spot that she can successfully get through the day without fear of lows  Was having frequent low BG (30-40s ) in middle of the night after taking Lantus insulin (21units) at 7pm-she would go low around 0200-  Taking medications pattern  [x] Consistent administration -recently stopped taking   [x] Affordable  Social determinants of health impacting diabetes self-management practices   Struggling with anxiety and/or depression and Concerned that you need to know more about how to stay healthy with diabetes  Overall evaluation:    [x] Striving to achieving A1c target with drug therapy & self-care practices    Subjective   Sitting up in bed this AM, states she feels better -Ready for discharge so she can make an eye appointment    Discussed safe insulin regimen for discharge with her -questions answered. Also encouraged her to see a GI doc if these symptoms become more frequent to rule out gastroparesis.     Family member at her bedside, and also participated in patient's interview and PMH      Objective   Physical exam  General Normal weight   Female  in no acute distress. Conversant and cooperative  Neuro  Alert, oriented   Vital Signs   Visit Vitals  /81 (BP 1 Location: Left upper arm, BP Patient Position: At rest)   Pulse (!) 55   Temp 97.9 °F (36.6 °C)   Resp 16   Wt 64.3 kg (141 lb 11.2 oz)   SpO2 99%   BMI 23.22 kg/m²     Skin  Warm and dry. Heart   Regular rate and rhythm. No murmurs, rubs or gallops  Lungs  Clear to auscultation without rales or rhonchi  Extremities No foot wounds    Diabetic foot exam: + diagnosed peripheral neuropathy    Left Foot     Visual Exam: normal    Pulse DP: 2+ (normal)   Filament test: reduced sensation      Right Foot   Visual Exam: normal    Pulse DP: 2+ (normal)   Filament test: reduced sensation     DP & PT pulses +2. Laboratory  Recent Labs     03/04/22  0148 03/03/22  0757 03/03/22  0355 03/02/22  1301 03/02/22  1301   * 196* 283*   < > 397*   AGAP 3* 5 10   < > 17*   WBC 5.4  --   --   --  7.1   CREA 0.57 0.68 0.57   < > 0.82   GFRNA >60 >60 >60   < > >60   AST  --   --  20  --  28   ALT  --   --  19  --  24    < > = values in this interval not displayed. Factors impacting BG management  Factor Dose Comments   Nutrition:  Standard meals     60 grams/meal  s    Pain Neuropathic in feet    Infection n/a    Other:   Kidney function  Liver function     WNL  WNL      Blood glucose pattern      Significant diabetes-related events over the past 24-72 hours  A1C 9.2%  Initially presented in mild DKA w/out acidosis and corrected with IVF   3/3/22- Fasting , had about 26% of breakfast this AM, n/v subsided with meds/IVF  3/4/22: . No evidence of low BG overnight. Po intake adequate.     Assessment and Plan   Nursing Diagnosis Risk for unstable blood glucose pattern   Nursing Intervention Domain 1115 Decision-making Support   Nursing Interventions Examined current inpatient diabetes/blood glucose control   Explored factors facilitating and impeding inpatient management  Explored corrective strategies with patient and responsible inpatient provider   Informed patient of rational for insulin strategy while hospitalized     Nursing Diagnosis 53640 Ineffective Health Management   Nursing Intervention Domain 525 Decision-makingSupport   Nursing Interventions Identified diabetes self-management practices impeding diabetes control  Discussed diabetes survival skills related to  1. Healthy Plate eating plan; given handouts  2. Role of physical activity in improving insulin sensitivity and action  3. Procedure for blood glucose monitoring & options for low-cost products available from [facility]   4. Medications plan at discharge     Evaluation   This is a 44yo female with T1DM/ BRENDAN since 2019. Presented to [facility] in mild DKA and transferred last PM to [facility] for [facility] care. DKA since resolved. Since her T1DM diagnosis she has struggled with managing her BG levels and has had multiple issues/ and ED /hospital visits for severe hypoglycemia. Most recently she was on Lantus/Humalog TID/ and correction insulin regimen but was still going low in middle of night after taking Lantus at 1900. She recently stopped taking the Lantus due the \"lows\" and was using Humalog to 'cover for meals and correctional' but was finding out she was spiking high. She has a Dexcom, and utilizes data appropriately. Her follow up care with endocrinologist is consistent, and has another appt. Scheduled in mid march. She verbalizes frustrations with finding a 'good medium' for managing her T1DM without lows or significant high BG. She is engaged and wants to do the right things to manage her disease. She states, \"I'm a rule follower and will do what I am told\"       While hopitalized, will initiate insulin subcutaneous order set with recs below. Target .   Conferred with hospitalist , Dr. Calvin Duncan Cynthia re: recs with his agreement. Will continue to work with patient/ MD to come up with a sustainable discharge plan to ensure reduced risk of low BG. Also verbalized plan with patient's RN with verbal understanding of plan. This patient would benefit from diabetes self-management education and support (DSMES) after discharge-she has been through initial diabetes education at time of diagnosis in 2019 but could use assistance from an RD and CDE to further support her. 3/4/22: Discharge plan discussed with Dr. Delilah Dunn with his input. See discharge plan below. Discharge planning   1. Please  follow up with Dr. No Baker for insulin adjustments and diabetes management. Will call patient with appointment time. 2. Insulin regimen:  BASAL -basaglar 15 units daily in AM  Carb ratio: 1: 20  Correction Factor: 1:50 over       Billing Code(s)   22160    Before making these care recommendations, I personally reviewed the hospitalization record, including notes, laboratory & diagnostic data and current medications, and examined the patient at the bedside (circumstances permitting) before making care recommendations. More than fifty (50) percent of the time was spent in patient counseling and/or care coordination.   Total minutes: [address] [place]  Diabetes Clinical Nurse Specialist  [facility]  Access via [facility]

## 2022-03-04 NOTE — DISCHARGE SUMMARY
Discharge Summary       PATIENT ID: Nilesh Ann  MRN: [id number]   YOB: 1980    DATE OF ADMISSION: 3/2/2022 12:53 PM    DATE OF DISCHARGE: 3/4/22   PRIMARY CARE PROVIDER: Lesley Mazariegos NP     ATTENDING PHYSICIAN: Carlo Ordonez MD  DISCHARGING PROVIDER: Carlo Ordonez MD    To contact this individual call [phone] and ask the  to page. If unavailable ask to be transferred the Adult Hospitalist Department. CONSULTATIONS: None    PROCEDURES/SURGERIES: * No surgery found *    ADMITTING DIAGNOSES & HOSPITAL COURSE:   41 y.o. female presents with few hours duration of abrupt onset, constant, unchanging, severe, vomiting that is associated with symptoms of diarrhea and altered sensorium.  Patient denies exacerbating features  and denies remitting features. DKA  -Patient is technically in DKA, but I am not sure if it is severe enough to necessitate insulin drip  -I am still waiting on labs that I ordered very early on; once these come back, I will determine if the patient needs to be transferred to a higher level of care or if she can be managed without an insulin drip  -In the meantime, will manage patient with subcutaneous insulin  Fluid resuscitate the patient     Type 1 diabetes  -Hold patient's home medications the time being    Hospital course  Patient was given fluids and DKA corrected without the need for insulin drip. Upon my seeing in the hospital we adjusted her insulin regimen along with the diabetes management team.  Per the patient, her diabetes has been difficult to control on her insulin regimen has not been adequate. The patient will have follow-up with endocrinology at [facility]. We managed to find an insulin regimen which kept her pretty stable and she was discharged with new insulin regimen along with corrective insulin and will follow up with primary care provider and endocrinology. DISCHARGE DIAGNOSES / PLAN:      1. DKA. Resolved.   2. Type 1 diabetes. Stable. 3. Hyperglycemia. Resolved. PENDING TEST RESULTS:   At the time of discharge the following test results are still pending: none    FOLLOW UP APPOINTMENTS:    Follow-up Information     Follow up With Specialties Details Why Contact Cristopher Bartholomew NP Nurse Practitioner In 1 week hospital follow up [address]  [phone]         See your endocrinologist as soon as possible. ADDITIONAL CARE RECOMMENDATIONS: none    DIET: Regular diet    ACTIVITY: Activity as tolerated    WOUND CARE: none    EQUIPMENT needed: none      DISCHARGE MEDICATIONS:  Current Discharge Medication List      START taking these medications    Details   glucose blood VI test strips (blood glucose test) strip Check POC BG as indicated  Qty: 100 Strip, Refills: 0  Start date: 3/4/2022      !! insulin lispro (HUMALOG) 100 unit/mL injection INITIATE CORRECTIVE INSULIN PROTOCOL (AUGUSTINE):  High Sensitivity (thin, ESRD):    AC (before meals), Q6H, and Q4H CORRECTIONAL SCALE only For Blood Sugar (mg/dl) of :             140-199=0 units            200-249=2 units  250-299=3 units  300-349=4 units  350 or greater = Call MD  Give in addition to basal medications. Do Not Hold for NPO    BEDTIME CORRECTIONAL sliding scale when scheduled:  200-249=1 units  250-349=2 units  350 or greater = Call MD  Give in addition to basal medications. Do Not Hold for NPO Fast Acting - Administer Immediately - or within 15 minutes of start of meal, if mealtime coverage. Qty: 1 mL, Refills: 0  Start date: 3/4/2022      insulin detemir U-100 (LEVEMIR FLEXTOUCH) 100 unit/mL (3 mL) inpn 15 Units by SubCUTAneous route daily (with breakfast). Qty: 5 mL, Refills: 0  Start date: 3/4/2022       !! - Potential duplicate medications found. Please discuss with provider. CONTINUE these medications which have NOT CHANGED    Details   DULoxetine (CYMBALTA) 60 mg capsule Take 60 mg by mouth daily. gabapentin (NEURONTIN) 300 mg capsule Take 1,600 mg by mouth three (3) times daily. !! insulin lispro (HUMALOG) 100 unit/mL injection As directed for carbohydrates (1:10) and for high glucoses (1:25)  Qty: 3 Vial, Refills: 0       !! - Potential duplicate medications found. Please discuss with provider. STOP taking these medications       insulin glargine (LANTUS U-100 INSULIN) 100 unit/mL injection Comments:   Reason for Stopping:         FREESTYLE WILEY 14 DAY SENSOR kit Comments:   Reason for Stopping:                 NOTIFY YOUR PHYSICIAN FOR ANY OF THE FOLLOWING:   Fever over 101 degrees for 24 hours. Chest pain, shortness of breath, fever, chills, nausea, vomiting, diarrhea, change in mentation, falling, weakness, bleeding. Severe pain or pain not relieved by medications. Or, any other signs or symptoms that you may have questions about. DISPOSITION:  X  Home With:   OT  PT  HH  RN       Long term SNF/Inpatient Rehab    Independent/assisted living    Hospice    Other:       PATIENT CONDITION AT DISCHARGE:     Functional status    Poor     Deconditioned    X Independent      Cognition   X  Lucid     Forgetful     Dementia      Catheters/lines (plus indication)    S[place]y     PICC     PEG    X None      Code status   X  Full code     DNR      PHYSICAL EXAMINATION AT DISCHARGE:  Constitutional:  No acute distress, cooperative, pleasant    ENT:  Oral mucosa moist, oropharynx benign. Resp:  CTA bilaterally. No wheezing/rhonchi/rales. No accessory muscle use   CV:  Regular rhythm, normal rate, no murmurs, gallops, rubs    GI:  Soft, non distended, non tender. normoactive bowel sounds, no hepatosplenomegaly     Musculoskeletal:  No edema, warm, 2+ pulses throughout    Neurologic:  Moves all extremities.   AAOx3, CN II-XII reviewed       CHRONIC MEDICAL DIAGNOSES:  Problem List as of 3/4/2022 Date Reviewed: 7/2/2019          Codes Class Noted - Resolved    Type 1 diabetes ([facility] [address] ICD-10-CM: E10.9  ICD-9-CM: 250.01  Unknown - Present        BRENDAN (latent autoimmune diabetes of adulthood), managed as type 1 ([facility] ICD-10-CM: E13.9  ICD-9-CM: 250.00  6/25/2019 - Present    Overview Signed 6/25/2019  3:45 PM by Donny Blevins MD     June 2019.               RESOLVED: Hyperglycemia ICD-10-CM: R73.9  ICD-9-CM: 790.29  3/2/2022 - 3/4/2022        RESOLVED: DKA (diabetic ketoacidosis) ([facility] ICD-10-CM: E11.10  ICD-9-CM: 250.12  6/13/2019 - 3/4/2022              Greater than 32 minutes were spent with the patient on counseling and coordination of care    Signed:   Therese Castillo MD  3/4/2022  11:00 AM

## 2022-03-04 NOTE — DISCHARGE INSTRUCTIONS
Diabetes Management:  1. Take a blood glucose reading as indicated by Dexcom. Please continue to share your data with your endocrinologist so she can make proper adjustments to your insulin regimen. :    If your blood sugar is over 200 consistently OR   If your blood sugar is under 100 consistently: call your doctor for a medication adjustment   Goal blood sugar is: 150________________    2. Take your insulin two times a day- first thing in the morning and before bedtime. Goal A1C is 7%. 3. Make a follow up appointment with your endocrinologist or primary care physician. Dr. Bee Dlae office will contact you with follow up appt. Date and time. Please see him within the next 2-4 weeks. 4. Make sure to get a DILATED eye exam every year. This checks for retinal blood vessel damage caused by long term high blood sugar. 5. Make sure to examine your feet every day looking for any wound or foot irritation as sensation can be impaired in your feet. Always wear socks and/or slippers even while at home. This will protect your feet from injury. 6. Diabetes Self Management Training: (highly encouraged)  Outpatient appointments are available with a certified diabetic educator who can  you with meal planning, insulin administration and other diabetes management strategies. Please call [phone] to schedule at a location close to your home.

## 2022-03-04 NOTE — PROGRESS NOTES
Attempted to schedule hospital follow up PCP appointment. Received recording, office is closed on Fridays. Left voicemail message and call back information. Arturo Garcia, Care Management Assistant.

## 2022-03-04 NOTE — PROGRESS NOTES
Primary Nurse Macey Miranda RN and Jorden Horvath RN performed a dual skin assessment on this patient No impairment noted  Coy score is 23

## 2022-03-19 PROBLEM — E13.9: Status: ACTIVE | Noted: 2019-06-25

## 2022-04-22 ENCOUNTER — TRANSCRIBE ORDER (OUTPATIENT)
Dept: GENERAL RADIOLOGY | Age: 42
End: 2022-04-22

## 2022-04-22 ENCOUNTER — HOSPITAL ENCOUNTER (OUTPATIENT)
Dept: GENERAL RADIOLOGY | Age: 42
Discharge: HOME | End: 2022-04-22
Payer: COMMERCIAL

## 2022-04-22 DIAGNOSIS — R11.2 NAUSEA WITH VOMITING: Primary | ICD-10-CM

## 2022-04-22 DIAGNOSIS — R11.2 NAUSEA WITH VOMITING: ICD-10-CM

## 2022-04-22 PROCEDURE — 74018 RADEX ABDOMEN 1 VIEW: CPT | Performed by: INTERNAL MEDICINE

## 2022-08-10 NOTE — PERIOP NOTES
[facility]  Ambulatory Surgery Unit  Pre-operative Instructions    Surgery/Procedure Date  Wednesday August 17th            Tentative Arrival Time TBD      1. On the day of your surgery/procedure, please report to the Ambulatory Surgery Unit Registration Desk and sign in at your designated time. The Ambulatory Surgery Unit is located in [facility] on the [facility] side of the [facility] across from the [address] [place] [address]. Please have all of your health insurance cards, co-payment, covid vaccination card, and a photo ID.    **TWO adults may accompany you the day of the procedure. We have limited seating available. If our waiting room is at capacity, your ride may be asked to remain in their vehicle. No one under 15 is allowed in the waiting room. Masks, fully covering the mouth and nose, are required in the waiting room. 2. You must have someone with you to drive you home, as you should not drive a car for 24 hours following anesthesia. Please make arrangements for a responsible adult friend or family member to stay with you for at least the first 24 hours after your surgery. 3. Do not have anything to eat or drink (including water, gum, mints, coffee, juice) after 11:59 PM  on Tuesday 8/16. This may not apply to medications prescribed by your physician. (Please note below the special instructions with medications to take the morning of surgery, if applicable.)    4. We recommend you do not drink any alcoholic beverages for 24 hours before and after your surgery. 5. Contact your surgeons office for instructions on the following medications: non-steroidal anti-inflammatory drugs (i.e. Advil, Aleve), vitamins, and supplements. (Some surgeons will want you to stop these medications prior to surgery and others may allow you to take them)   **If you are currently taking Plavix, Coumadin, Aspirin and/or other blood-thinning agents, contact your surgeon for instructions. ** Your surgeon will partner with the physician prescribing these medications to determine if it is safe to stop or if you need to continue taking. Please do not stop taking these medications without instructions from your surgeon. 6. In an effort to help prevent surgical site infection, we ask that you shower with an anti-bacterial soap (i.e. Dial/Safeguard, or the soap provided to you at your preadmission testing appointment) the night prior to and/or on the morning of surgery, using a fresh towel after each shower. (Please begin this process with fresh bed linens.) Do not apply any lotions, powders, or deodorants after the shower on the day of your procedure. If applicable, please do not shave the operative site for 48 hours prior to surgery. 7. Wear comfortable clothes. Wear glasses instead of contacts. Do not bring any jewelry or money (other than copays or fees as instructed). Do not wear make-up, particularly mascara, the morning of your surgery. Do not wear nail polish, particularly if you are having foot /hand surgery. Wear your hair loose or down, no ponytails, buns, kiana pins or clips. All body piercings must be removed. 8. You should understand that if you do not follow these instructions your surgery may be cancelled. If your physical condition changes (i.e. fever, cold or flu) please contact your surgeon as soon as possible. 9. It is important that you be on time. If a situation occurs where you may be late, or if you have any questions or problems, please call [phone].    10. Your surgery time may be subject to change. You will receive a phone call the day prior to surgery to confirm your arrival time. 11. Pediatric patients: please bring a change of clothes, diapers, bottle/sippy cup, pacifier, etc.      Special Instructions:     Take all medications and inhalers, as prescribed, on the morning of surgery with a sip of water EXCEPT: diabetic medications      Insulin Dependent Diabetic patients: Take your diabetic medications as prescribed the day before surgery. Hold all diabetic medications the day of surgery. If you are scheduled to arrive for surgery after 8:00 AM, and your AM blood sugar is >200, please call Ambulatory Surgery. I understand a pre-operative phone call will be made to verify my surgery time. In the event that I am not available, I give permission for a message to be left on my answering service and/or with another person?       Yes    Reviewed instructions and sending to patients [facility] my chart account, patient verbalized understanding         ___________________      ___________________      ________________  (Signature of Patient)          (Witness)                   (Date and Time)

## 2022-08-11 NOTE — PERIOP NOTES
Spoke with Monae Bates NP regarding pt's H&P. It was filled out by GI doctor, Dr. Julio Cesar Navarro with [facility]. He is a MD, and form is completed head to toe. Okay to use for H&P. She does want me to attempt reaching [facility] endocrine group for clearance from a diabetic standpoint. Did reach out to [facility] and forwarded message to Karma Torres.

## 2022-08-12 PROBLEM — H25.811 COMBINED FORMS OF AGE-RELATED CATARACT OF RIGHT EYE: Status: ACTIVE | Noted: 2022-08-12

## 2022-08-16 ENCOUNTER — ANESTHESIA EVENT (OUTPATIENT)
Dept: SURGERY | Age: 42
End: 2022-08-16
Payer: COMMERCIAL

## 2022-08-17 ENCOUNTER — HOSPITAL ENCOUNTER (OUTPATIENT)
Age: 42
Setting detail: OUTPATIENT SURGERY
Discharge: HOME | End: 2022-08-17
Attending: OPHTHALMOLOGY | Admitting: OPHTHALMOLOGY
Payer: COMMERCIAL

## 2022-08-17 ENCOUNTER — ANESTHESIA (OUTPATIENT)
Dept: SURGERY | Age: 42
End: 2022-08-17
Payer: COMMERCIAL

## 2022-08-17 VITALS
BODY MASS INDEX: 22.82 KG/M2 | TEMPERATURE: 98 F | RESPIRATION RATE: 12 BRPM | HEIGHT: 65 IN | DIASTOLIC BLOOD PRESSURE: 76 MMHG | HEART RATE: 59 BPM | OXYGEN SATURATION: 97 % | SYSTOLIC BLOOD PRESSURE: 113 MMHG | WEIGHT: 137 LBS

## 2022-08-17 LAB
GLUCOSE BLD STRIP.AUTO-MCNC: 266 MG/DL (ref 65–117)
GLUCOSE BLD STRIP.AUTO-MCNC: 285 MG/DL (ref 65–117)
HCG UR QL: NEGATIVE
SERVICE CMNT-IMP: ABNORMAL
SERVICE CMNT-IMP: ABNORMAL

## 2022-08-17 PROCEDURE — 82962 GLUCOSE BLOOD TEST: CPT

## 2022-08-17 PROCEDURE — 74011250637 HC RX REV CODE- 250/637: Performed by: OPHTHALMOLOGY

## 2022-08-17 PROCEDURE — V2632 POST CHMBR INTRAOCULAR LENS: HCPCS | Performed by: OPHTHALMOLOGY

## 2022-08-17 PROCEDURE — 76210000046 HC AMBSU PH II REC FIRST 0.5 HR: Performed by: OPHTHALMOLOGY

## 2022-08-17 PROCEDURE — 76030000000 HC AMB SURG OR TIME 0.5 TO 1: Performed by: OPHTHALMOLOGY

## 2022-08-17 PROCEDURE — 74011250636 HC RX REV CODE- 250/636: Performed by: OPHTHALMOLOGY

## 2022-08-17 PROCEDURE — 76060000061 HC AMB SURG ANES 0.5 TO 1 HR: Performed by: OPHTHALMOLOGY

## 2022-08-17 PROCEDURE — 74011250637 HC RX REV CODE- 250/637

## 2022-08-17 PROCEDURE — 81025 URINE PREGNANCY TEST: CPT

## 2022-08-17 PROCEDURE — V2787 ASTIGMATISM-CORRECT FUNCTION: HCPCS | Performed by: OPHTHALMOLOGY

## 2022-08-17 PROCEDURE — 2709999900 HC NON-CHARGEABLE SUPPLY: Performed by: OPHTHALMOLOGY

## 2022-08-17 PROCEDURE — 74011250636 HC RX REV CODE- 250/636: Performed by: NURSE ANESTHETIST, CERTIFIED REGISTERED

## 2022-08-17 PROCEDURE — 76210000034 HC AMBSU PH I REC 0.5 TO 1 HR: Performed by: OPHTHALMOLOGY

## 2022-08-17 PROCEDURE — 74011000250 HC RX REV CODE- 250

## 2022-08-17 PROCEDURE — 74011000250 HC RX REV CODE- 250: Performed by: OPHTHALMOLOGY

## 2022-08-17 DEVICE — IMPLANTABLE DEVICE: Type: IMPLANTABLE DEVICE | Site: EYE | Status: FUNCTIONAL

## 2022-08-17 RX ORDER — ACETAMINOPHEN 500 MG
1000 TABLET ORAL ONCE
Status: COMPLETED | OUTPATIENT
Start: 2022-08-17 | End: 2022-08-17

## 2022-08-17 RX ORDER — SODIUM CHLORIDE 0.9 % (FLUSH) 0.9 %
5-40 SYRINGE (ML) INJECTION AS NEEDED
Status: DISCONTINUED | OUTPATIENT
Start: 2022-08-17 | End: 2022-08-17 | Stop reason: HOSPADM

## 2022-08-17 RX ORDER — LIDOCAINE HYDROCHLORIDE 10 MG/ML
0.1 INJECTION, SOLUTION EPIDURAL; INFILTRATION; INTRACAUDAL; PERINEURAL AS NEEDED
Status: DISCONTINUED | OUTPATIENT
Start: 2022-08-17 | End: 2022-08-17 | Stop reason: HOSPADM

## 2022-08-17 RX ORDER — MIDAZOLAM HYDROCHLORIDE 1 MG/ML
INJECTION, SOLUTION INTRAMUSCULAR; INTRAVENOUS AS NEEDED
Status: DISCONTINUED | OUTPATIENT
Start: 2022-08-17 | End: 2022-08-17 | Stop reason: HOSPADM

## 2022-08-17 RX ORDER — ONDANSETRON 2 MG/ML
4 INJECTION INTRAMUSCULAR; INTRAVENOUS AS NEEDED
Status: DISCONTINUED | OUTPATIENT
Start: 2022-08-17 | End: 2022-08-17 | Stop reason: HOSPADM

## 2022-08-17 RX ORDER — TETRACAINE HYDROCHLORIDE 5 MG/ML
1 SOLUTION OPHTHALMIC
Status: ACTIVE | OUTPATIENT
Start: 2022-08-17 | End: 2022-08-17

## 2022-08-17 RX ORDER — PROPARACAINE HYDROCHLORIDE 5 MG/ML
1 SOLUTION/ DROPS OPHTHALMIC ONCE
Status: COMPLETED | OUTPATIENT
Start: 2022-08-17 | End: 2022-08-17

## 2022-08-17 RX ORDER — ERYTHROMYCIN 5 MG/G
OINTMENT OPHTHALMIC ONCE
Status: COMPLETED | OUTPATIENT
Start: 2022-08-17 | End: 2022-08-17

## 2022-08-17 RX ORDER — FENTANYL CITRATE 50 UG/ML
25 INJECTION, SOLUTION INTRAMUSCULAR; INTRAVENOUS
Status: DISCONTINUED | OUTPATIENT
Start: 2022-08-17 | End: 2022-08-17 | Stop reason: HOSPADM

## 2022-08-17 RX ORDER — TROPICAMIDE 10 MG/ML
1 SOLUTION/ DROPS OPHTHALMIC ONCE
Status: COMPLETED | OUTPATIENT
Start: 2022-08-17 | End: 2022-08-17

## 2022-08-17 RX ORDER — DIPHENHYDRAMINE HYDROCHLORIDE 50 MG/ML
12.5 INJECTION, SOLUTION INTRAMUSCULAR; INTRAVENOUS AS NEEDED
Status: DISCONTINUED | OUTPATIENT
Start: 2022-08-17 | End: 2022-08-17 | Stop reason: HOSPADM

## 2022-08-17 RX ORDER — TROPICAMIDE 10 MG/ML
SOLUTION/ DROPS OPHTHALMIC
Status: COMPLETED
Start: 2022-08-17 | End: 2022-08-17

## 2022-08-17 RX ORDER — POVIDONE-IODINE 10 %
SOLUTION, NON-ORAL TOPICAL
Status: DISCONTINUED | OUTPATIENT
Start: 2022-08-17 | End: 2022-08-17 | Stop reason: HOSPADM

## 2022-08-17 RX ORDER — SODIUM CHLORIDE, SODIUM LACTATE, POTASSIUM CHLORIDE, CALCIUM CHLORIDE 600; 310; 30; 20 MG/100ML; MG/100ML; MG/100ML; MG/100ML
25 INJECTION, SOLUTION INTRAVENOUS CONTINUOUS
Status: DISCONTINUED | OUTPATIENT
Start: 2022-08-17 | End: 2022-08-17 | Stop reason: HOSPADM

## 2022-08-17 RX ORDER — SODIUM CHLORIDE 0.9 % (FLUSH) 0.9 %
5-40 SYRINGE (ML) INJECTION EVERY 8 HOURS
Status: DISCONTINUED | OUTPATIENT
Start: 2022-08-17 | End: 2022-08-17 | Stop reason: HOSPADM

## 2022-08-17 RX ORDER — ACETAMINOPHEN 500 MG
TABLET ORAL
Status: COMPLETED
Start: 2022-08-17 | End: 2022-08-17

## 2022-08-17 RX ORDER — DICLOFENAC SODIUM 1 MG/ML
1 SOLUTION/ DROPS OPHTHALMIC ONCE
Status: COMPLETED | OUTPATIENT
Start: 2022-08-17 | End: 2022-08-17

## 2022-08-17 RX ORDER — PREDNISOLONE ACETATE 10 MG/ML
1 SUSPENSION/ DROPS OPHTHALMIC 2 TIMES DAILY
Status: DISCONTINUED | OUTPATIENT
Start: 2022-08-17 | End: 2022-08-17 | Stop reason: HOSPADM

## 2022-08-17 RX ORDER — SODIUM CHLORIDE 9 MG/ML
25 INJECTION, SOLUTION INTRAVENOUS CONTINUOUS
Status: DISCONTINUED | OUTPATIENT
Start: 2022-08-17 | End: 2022-08-17 | Stop reason: HOSPADM

## 2022-08-17 RX ORDER — FENTANYL CITRATE 50 UG/ML
INJECTION, SOLUTION INTRAMUSCULAR; INTRAVENOUS AS NEEDED
Status: DISCONTINUED | OUTPATIENT
Start: 2022-08-17 | End: 2022-08-17 | Stop reason: HOSPADM

## 2022-08-17 RX ORDER — LIDOCAINE HYDROCHLORIDE 40 MG/ML
3 INJECTION, SOLUTION RETROBULBAR; TOPICAL ONCE
Status: COMPLETED | OUTPATIENT
Start: 2022-08-17 | End: 2022-08-17

## 2022-08-17 RX ORDER — DICLOFENAC SODIUM 1 MG/ML
SOLUTION/ DROPS OPHTHALMIC
Status: COMPLETED
Start: 2022-08-17 | End: 2022-08-17

## 2022-08-17 RX ORDER — ONDANSETRON 2 MG/ML
INJECTION INTRAMUSCULAR; INTRAVENOUS AS NEEDED
Status: DISCONTINUED | OUTPATIENT
Start: 2022-08-17 | End: 2022-08-17 | Stop reason: HOSPADM

## 2022-08-17 RX ADMIN — DICLOFENAC SODIUM 1 DROP: 1 SOLUTION/ DROPS OPHTHALMIC at 08:10

## 2022-08-17 RX ADMIN — Medication 1000 MG: at 08:09

## 2022-08-17 RX ADMIN — MIDAZOLAM HYDROCHLORIDE 1 MG: 1 INJECTION, SOLUTION INTRAMUSCULAR; INTRAVENOUS at 09:12

## 2022-08-17 RX ADMIN — ONDANSETRON HYDROCHLORIDE 4 MG: 2 INJECTION, SOLUTION INTRAMUSCULAR; INTRAVENOUS at 09:16

## 2022-08-17 RX ADMIN — FENTANYL CITRATE 25 MCG: 50 INJECTION, SOLUTION INTRAMUSCULAR; INTRAVENOUS at 09:12

## 2022-08-17 RX ADMIN — TROPICAMIDE 1 DROP: 10 SOLUTION/ DROPS OPHTHALMIC at 08:10

## 2022-08-17 RX ADMIN — MIDAZOLAM HYDROCHLORIDE 1 MG: 1 INJECTION, SOLUTION INTRAMUSCULAR; INTRAVENOUS at 09:16

## 2022-08-17 RX ADMIN — SODIUM CHLORIDE 25 ML/HR: 9 INJECTION, SOLUTION INTRAVENOUS at 08:11

## 2022-08-17 RX ADMIN — PROPARACAINE HYDROCHLORIDE 1 DROP: 5 SOLUTION/ DROPS OPHTHALMIC at 08:10

## 2022-08-17 NOTE — OP NOTES
Name: Bruna Wheeler MASC-4        updated 3/1/2013  Description:  Adilson Norris with intraocular lens implant    PREOPERATIVE DIAGNOSIS: Visually impairing combined cataract, Right eye. POSTOPERATIVE DIAGNOSIS: Visually impairing combined   cataract,Right eye. OPERATION: Procedure(s):  RIGHT EYE ONLY REFRACTIVE CATARACT REMOVAL WITH TORIC LENS IMPLANTATION    ANESTHESIA: Topical with intravenous sedation    TYPE OF LENS IMPLANT USED:   Implant Name Type Inv. Item Serial No.  Lot No. LRB No. Used Action   LENS IO +265 DIOPT CYL PWR +150 DIOPT L13MM DIA6MM 0DEG - [id number]  LENS IO +265 DIOPT CYL PWR +150 DIOPT L13MM DIA6MM 0DEG [id number] [employer] NA Right 1 Implanted       PHACO TIME:   50.6 seconds at an average power of 12.3%. Estimated blood loss: None    Complications:None    Specimen removed: None    DESCRIPTION OF PROCEDURE:  The patient was brought to the holding area where an IV was begun at a  keep open rate. The patient was connected to cardiovascular monitoring. The patient received 1 drop of mydriacyl 1% and proparacaine 0.5%. The patient was then brought back to the operating suite and cardiovascular monitoring was reestablished. The patient was  prepped and draped in the usual manner for ocular surgery. The patient received 1 drop of Proparacaine followed by 2 drops of 5% Betadine solution in the inferior conjunctival cul-de-sac The operating microscope was brought into position and 4% Xylocaine drops were instilled onto the eye. The lid speculum was set into position. A paracentesis was created and Sugarcane solution was instilled into the anterior chamber for adequate anesthesia and pupillary dilation. Viscoelastic was instilled into the anterior chamber. The 2.4 mm keratome was then utilized to create a clear corneal incision, and a circular capsulorrhexis was performed. BSS was utilized to perform careful hydrodissection of the lens.  Viscoelastic was then instilled into the anterior chamber to protect the corneal endothelium. Phacoemulsification was then carried out. Any remaining cortical material was removed in irrigation/aspiration mode. Viscoelastic was then instilled into the capsular bag. The lens implant was inspected for any defects. After finding none, the lens was placed in its injector and inserted into the capsular bag. The lens implant was centered on the patient's visual/astigmatic axis. The viscoelastic was then removed from the eye. Miochol was instilled posterior to the iris plane to facilitate pupillary miosis. The wound was checked for any leaks, after finding none, Iopidine and Pred Forte drops were instilled into the inferior cul-de-sac, and erythromycin ointment was placed over the cornea. A semi-pressure dressing and shield were then placed for the patient. The patient tolerated the procedure very well, and was brought to the recovery room in an alert and stable and satisfactory postoperative condition. Instructions were given to the patient and their family for their immediate postoperative care.   [address] [place] [address],   8/17/2022

## 2022-08-17 NOTE — ANESTHESIA POSTPROCEDURE EVALUATION
Procedure(s):  RIGHT EYE ONLY REFRACTIVE CATARACT REMOVAL WITH LENS IMPLANTATION. MAC    Anesthesia Post Evaluation      Multimodal analgesia: multimodal analgesia used between 6 hours prior to anesthesia start to PACU discharge  Patient location during evaluation: PACU  Patient participation: complete - patient participated  Level of consciousness: awake and alert  Pain score: 0  Airway patency: patent  Anesthetic complications: no  Cardiovascular status: acceptable  Respiratory status: acceptable  Hydration status: acceptable  Comments: Pt's blood glucose is runnng high. Hgb A1c is 9. Will take insulin when she gets home.   Post anesthesia nausea and vomiting:  none  Final Post Anesthesia Temperature Assessment:  Normothermia (36.0-37.5 degrees C)      INITIAL Post-op Vital signs:   Vitals Value Taken Time   /76 08/17/22 0945   Temp 36.7 °C (98 °F) 08/17/22 0926   Pulse 59 08/17/22 0945   Resp 12 08/17/22 0945   SpO2 97 % 08/17/22 0945

## 2022-08-17 NOTE — PERIOP NOTES
Alexandra Colvinlamonte  1980  [id number]    Situation:  Verbal report given from: RN and CRNA  Procedure: Procedure(s):  RIGHT EYE ONLY REFRACTIVE CATARACT REMOVAL WITH LENS IMPLANTATION    Background:    Preoperative diagnosis: Combined forms of age-related cataract of right eye [H25.811]    Postoperative diagnosis: Combined forms of age-related cataract of right eye [H25.811]    :  Dr. Genesis Osman    Assistant(s): Circ-1: Sheryl Gutierrez RN  Scrub Tech-1: Indy Gray    Specimens: * No specimens in log *    Assessment:  Intra-procedure medications         Anesthesia gave intra-procedure sedation and medications, see anesthesia flow sheet     Intravenous fluids: LR@ KVO     Vital signs stable. Pt denies pain or chill.        Recommendation:    Permission to share finding with BODØ : yes

## 2022-08-17 NOTE — PERIOP NOTES
Blood Sugar 285-Pt would take 2 U Reg insulin. Has 30 minute drive home. Has glucose monitor and girlfriend better than pt on using. 1000 Dr. Sandy Bernstein stated to let patient treat with own insulin which she has in car. 1010 discharged to car without incident. Girlfriend states BS already coming down some and to give minimal short acting.

## 2022-08-17 NOTE — ANESTHESIA PREPROCEDURE EVALUATION
Relevant Problems   ENDOCRINE   (+) BRENDAN (latent autoimmune diabetes of adulthood), managed as type 1 (HCC)   (+) Type 1 diabetes ([facility] [address]       Anesthetic History               Review of Systems / Medical History  Patient summary reviewed, nursing notes reviewed and pertinent labs reviewed    Pulmonary          Smoker (occasional Marijuana)         Neuro/Psych     seizures (related to hypoglycemia)    Headaches (chronic)    Comments: Diabetic Neuropathy bilateral lower legs Cardiovascular  Within defined limits                Exercise tolerance: >4 METS     GI/Hepatic/Renal  Within defined limits              Endo/Other    Diabetes: poorly controlled, type 1         Other Findings   Comments: Cataract right eye    H/o admission for DKA 03/22; had episode of A fib (pt unaware)         Physical Exam    Airway  Mallampati: I  TM Distance: 4 - 6 cm  Neck ROM: normal range of motion   Mouth opening: Normal     Cardiovascular    Rhythm: regular  Rate: normal         Dental    Dentition: Full lower dentures and Full upper dentures     Pulmonary  Breath sounds clear to auscultation               Abdominal  GI exam deferred       Other Findings            Anesthetic Plan    ASA: 2  Anesthesia type: MAC          Induction: Intravenous  Anesthetic plan and risks discussed with: Patient      preop glucose 266 (Hgb A1c in the 9's)

## 2022-08-17 NOTE — DISCHARGE INSTRUCTIONS
Edward Mitchell D.O., P.CBrett  [facility].  [phone]    Post-Operative Instructions for Cataract Surgery    Remove your eye shield and bandage at 12 noon the same day as surgery and start your eye drops. THROW AWAY THE GAUZE UNDER THE SHIELD. Place the blue eye shield back on for one week while asleep, even if napping in the recliner. Use the tape included in your bag.              DO NOT RUB YOUR EYE EVER! DO NOT WIPE YOUR EYE! ONLY WIPE ON YOUR CHEEK!                DO NOT WEAR EYE MAKEUP FOR 1 WEEK! You can shower starting tomorrow. You may resume your previous diet. If you use glaucoma drops in the operative eye, continue them as directed. Common symptoms after surgery include a scratchy feeling, slight headache, red eye, and/or blurred vision. You may use Advil or Tylenol for any discomfort. Avoid strenuous activities and driving until you see Dr. Holland Iverson tomorrow. Please use care when walking, your depth perception may be altered. Bring your bag with your drops to your follow up appointment. CONTINUE DROPS UNTIL ALL BOTTLES ARE EMPTY! Follow-up appointment tomorrow at Dr. Mason Bobby office:______09:00am______________    Please call the office at [phone] if you experience severe pain or nausea. The following personal items collected during your admission are returned to you:   Dental Appliance: Dental Appliances: With patient, Other (comment) (retainers on top and bottom)  Vision: Visual Aid: None  Hearing Aid: @FLOW  DISCHARGE SUMMARY from Nurse  (1341:LAST)@  Jewelry: Jewelry: Raquel Wood, With patient  Clothing: Clothing: With patient  Other Valuables:  Other Valuables: Cell Phone, With patient (Cell phone in jacket pocket, jacket under stretcher)  Valuables sent to safe:        PATIENT INSTRUCTIONS:    After General Anesthesia or Intravenous Sedation, for 24 hours or while taking prescription Narcotics:        Someone should be with you for the next 24 hours. For your own safety, a responsible adult must drive you home. Limit your activities  Recommended activity: Rest today, up with assistance today. Do not climb stairs or shower unattended for the next 24 hours. Please start with a soft bland diet and advance as tolerated (no nausea) to regular diet. If you have a sore throat you should try the following: fluids, warm salt water gargles, or throat lozenges. If it does not improve after several days please follow up with your primary physician. Do not drive and operate hazardous machinery  Do not make important personal or business decisions  Do  not drink alcoholic beverages  If you have not urinated within 8 hours after discharge, please contact your surgeon on call. Report the following to your surgeon:  Excessive pain, swelling, redness or odor of or around the surgical area  Temperature over 100.5  Any nausea or vomiting. You will receive a Post Operative Call from one of the Recovery Room Nurses on the day after your surgery to check on you. It is very important for us to know how you are recovering after your surgery. If you have an issue or need to speak with someone, please call your surgeon, do not wait for the post operative call. You may receive an e-mail or letter in the mail from [employer] regarding your experience with us in the Ambulatory Surgery Unit. Your feedback is valuable to us and we appreciate your participation in the survey. If the above instructions are not adequate or you are having problems after your surgery, call the physician at their office number. We wish you a speedy recovery ? What to do at Home:      *  Please give a list of your current medications to your Primary Care Provider. *  Please update this list whenever your medications are discontinued, doses are      changed, or new medications (including over-the-counter products) are added.     *  Please carry medication information at all times in case of emergency situations. If you have not received your influenza and/or pneumococcal vaccine, please follow up with your primary care physician. The discharge information has been reviewed with the patient and family. The patient and family verbalized understanding. TO PREVENT AN INFECTION      [address] YOUR HANDS    To prevent infection, good handwashing is the most important thing you or your caregiver can do. Wash your hands with soap and water or use the hand  we gave you before you touch any wounds. USE CLEAN SHEETS    Use freshly cleaned sheets on your bed after surgery. To keep the surgery site clean, do not allow pets to sleep with you while your wound is still healing. STOP SMOKING    Stop smoking, or at least cut back on smoking    Smoking slows your healing. CONTROL YOUR BLOOD SUGAR    High blood sugars slow wound healing. If you are diabetic, control your blood sugar levels before and after your surgery.

## 2022-08-17 NOTE — PERIOP NOTES
Permission received to review discharge instructions and discuss private health information with partner, Aurora Martins. Patient states that Aurora Smithing will be with them for at least 24 hours following today's procedure.

## 2023-04-03 PROBLEM — E11.10 DKA (DIABETIC KETOACIDOSIS) (HCC): Status: RESOLVED | Noted: 2019-06-13 | Resolved: 2022-03-04

## (undated) DEVICE — SURGICAL PROCEDURE PACK CATRCT CUST

## (undated) DEVICE — GLOVE ORANGE PI 7   MSG9070

## (undated) DEVICE — Device

## (undated) DEVICE — SOLUTION IRRIG 500ML STRL H2O NONPYROGENIC

## (undated) DEVICE — THE MONARCH® "D" CARTRIDGE IS A SINGLE-USE POLYPROPYLENE CARTRIDGE FOR POSTERIOR CHAMBER IOL DELIVERY: Brand: MONARCH® III